# Patient Record
Sex: MALE | Race: WHITE | NOT HISPANIC OR LATINO | Employment: FULL TIME | URBAN - METROPOLITAN AREA
[De-identification: names, ages, dates, MRNs, and addresses within clinical notes are randomized per-mention and may not be internally consistent; named-entity substitution may affect disease eponyms.]

---

## 2021-05-08 ENCOUNTER — IMMUNIZATIONS (OUTPATIENT)
Dept: FAMILY MEDICINE CLINIC | Facility: HOSPITAL | Age: 56
End: 2021-05-08

## 2021-05-08 DIAGNOSIS — Z23 ENCOUNTER FOR IMMUNIZATION: Primary | ICD-10-CM

## 2021-05-08 PROCEDURE — 0001A SARS-COV-2 / COVID-19 MRNA VACCINE (PFIZER-BIONTECH) 30 MCG: CPT | Performed by: NURSE PRACTITIONER

## 2021-05-08 PROCEDURE — 91300 SARS-COV-2 / COVID-19 MRNA VACCINE (PFIZER-BIONTECH) 30 MCG: CPT | Performed by: NURSE PRACTITIONER

## 2021-06-04 ENCOUNTER — IMMUNIZATIONS (OUTPATIENT)
Dept: FAMILY MEDICINE CLINIC | Facility: HOSPITAL | Age: 56
End: 2021-06-04

## 2021-06-04 DIAGNOSIS — Z23 ENCOUNTER FOR IMMUNIZATION: Primary | ICD-10-CM

## 2021-06-04 PROCEDURE — 0002A SARS-COV-2 / COVID-19 MRNA VACCINE (PFIZER-BIONTECH) 30 MCG: CPT

## 2021-06-04 PROCEDURE — 91300 SARS-COV-2 / COVID-19 MRNA VACCINE (PFIZER-BIONTECH) 30 MCG: CPT

## 2021-11-05 ENCOUNTER — APPOINTMENT (OUTPATIENT)
Dept: RADIOLOGY | Facility: CLINIC | Age: 56
End: 2021-11-05
Payer: COMMERCIAL

## 2021-11-05 VITALS
BODY MASS INDEX: 25.2 KG/M2 | HEIGHT: 70 IN | WEIGHT: 176 LBS | SYSTOLIC BLOOD PRESSURE: 130 MMHG | DIASTOLIC BLOOD PRESSURE: 84 MMHG

## 2021-11-05 DIAGNOSIS — M25.511 RIGHT SHOULDER PAIN, UNSPECIFIED CHRONICITY: ICD-10-CM

## 2021-11-05 DIAGNOSIS — M24.811 INTERNAL DERANGEMENT OF RIGHT SHOULDER: Primary | ICD-10-CM

## 2021-11-05 PROCEDURE — 73030 X-RAY EXAM OF SHOULDER: CPT

## 2021-11-05 PROCEDURE — 99203 OFFICE O/P NEW LOW 30 MIN: CPT | Performed by: ORTHOPAEDIC SURGERY

## 2021-11-05 RX ORDER — ALBUTEROL SULFATE 90 UG/1
2 AEROSOL, METERED RESPIRATORY (INHALATION) EVERY 6 HOURS PRN
COMMUNITY

## 2021-11-05 RX ORDER — OLMESARTAN MEDOXOMIL 20 MG/1
20 TABLET ORAL DAILY
COMMUNITY

## 2021-11-10 ENCOUNTER — EVALUATION (OUTPATIENT)
Dept: PHYSICAL THERAPY | Facility: CLINIC | Age: 56
End: 2021-11-10
Payer: COMMERCIAL

## 2021-11-10 DIAGNOSIS — G89.29 CHRONIC RIGHT SHOULDER PAIN: Primary | ICD-10-CM

## 2021-11-10 DIAGNOSIS — M25.511 CHRONIC RIGHT SHOULDER PAIN: Primary | ICD-10-CM

## 2021-11-10 DIAGNOSIS — M24.811 INTERNAL DERANGEMENT OF RIGHT SHOULDER: ICD-10-CM

## 2021-11-10 PROCEDURE — 97110 THERAPEUTIC EXERCISES: CPT | Performed by: PHYSICAL THERAPIST

## 2021-11-10 PROCEDURE — 97161 PT EVAL LOW COMPLEX 20 MIN: CPT | Performed by: PHYSICAL THERAPIST

## 2021-11-19 ENCOUNTER — OFFICE VISIT (OUTPATIENT)
Dept: PHYSICAL THERAPY | Facility: CLINIC | Age: 56
End: 2021-11-19
Payer: COMMERCIAL

## 2021-11-19 DIAGNOSIS — M25.511 CHRONIC RIGHT SHOULDER PAIN: Primary | ICD-10-CM

## 2021-11-19 DIAGNOSIS — M24.811 INTERNAL DERANGEMENT OF RIGHT SHOULDER: ICD-10-CM

## 2021-11-19 DIAGNOSIS — G89.29 CHRONIC RIGHT SHOULDER PAIN: Primary | ICD-10-CM

## 2021-11-19 PROCEDURE — 97112 NEUROMUSCULAR REEDUCATION: CPT | Performed by: PHYSICAL THERAPIST

## 2021-11-19 PROCEDURE — 97110 THERAPEUTIC EXERCISES: CPT | Performed by: PHYSICAL THERAPIST

## 2021-11-26 ENCOUNTER — OFFICE VISIT (OUTPATIENT)
Dept: PHYSICAL THERAPY | Facility: CLINIC | Age: 56
End: 2021-11-26
Payer: COMMERCIAL

## 2021-11-26 DIAGNOSIS — M24.811 INTERNAL DERANGEMENT OF RIGHT SHOULDER: ICD-10-CM

## 2021-11-26 DIAGNOSIS — M25.511 CHRONIC RIGHT SHOULDER PAIN: Primary | ICD-10-CM

## 2021-11-26 DIAGNOSIS — G89.29 CHRONIC RIGHT SHOULDER PAIN: Primary | ICD-10-CM

## 2021-11-26 PROCEDURE — 97110 THERAPEUTIC EXERCISES: CPT | Performed by: PHYSICAL THERAPIST

## 2021-11-26 PROCEDURE — 97112 NEUROMUSCULAR REEDUCATION: CPT | Performed by: PHYSICAL THERAPIST

## 2021-12-03 ENCOUNTER — OFFICE VISIT (OUTPATIENT)
Dept: PHYSICAL THERAPY | Facility: CLINIC | Age: 56
End: 2021-12-03
Payer: COMMERCIAL

## 2021-12-03 DIAGNOSIS — G89.29 CHRONIC RIGHT SHOULDER PAIN: Primary | ICD-10-CM

## 2021-12-03 DIAGNOSIS — M25.511 CHRONIC RIGHT SHOULDER PAIN: Primary | ICD-10-CM

## 2021-12-03 DIAGNOSIS — M24.811 INTERNAL DERANGEMENT OF RIGHT SHOULDER: ICD-10-CM

## 2021-12-03 PROCEDURE — 97112 NEUROMUSCULAR REEDUCATION: CPT | Performed by: PHYSICAL THERAPIST

## 2021-12-03 PROCEDURE — 97110 THERAPEUTIC EXERCISES: CPT | Performed by: PHYSICAL THERAPIST

## 2021-12-10 ENCOUNTER — OFFICE VISIT (OUTPATIENT)
Dept: PHYSICAL THERAPY | Facility: CLINIC | Age: 56
End: 2021-12-10
Payer: COMMERCIAL

## 2021-12-10 DIAGNOSIS — G89.29 CHRONIC RIGHT SHOULDER PAIN: Primary | ICD-10-CM

## 2021-12-10 DIAGNOSIS — M24.811 INTERNAL DERANGEMENT OF RIGHT SHOULDER: ICD-10-CM

## 2021-12-10 DIAGNOSIS — M25.511 CHRONIC RIGHT SHOULDER PAIN: Primary | ICD-10-CM

## 2021-12-10 PROCEDURE — 97140 MANUAL THERAPY 1/> REGIONS: CPT | Performed by: PHYSICAL THERAPIST

## 2021-12-10 PROCEDURE — 97112 NEUROMUSCULAR REEDUCATION: CPT | Performed by: PHYSICAL THERAPIST

## 2021-12-17 ENCOUNTER — OFFICE VISIT (OUTPATIENT)
Dept: PHYSICAL THERAPY | Facility: CLINIC | Age: 56
End: 2021-12-17
Payer: COMMERCIAL

## 2021-12-17 DIAGNOSIS — M25.511 CHRONIC RIGHT SHOULDER PAIN: Primary | ICD-10-CM

## 2021-12-17 DIAGNOSIS — G89.29 CHRONIC RIGHT SHOULDER PAIN: Primary | ICD-10-CM

## 2021-12-17 DIAGNOSIS — M24.811 INTERNAL DERANGEMENT OF RIGHT SHOULDER: ICD-10-CM

## 2021-12-17 PROCEDURE — 97112 NEUROMUSCULAR REEDUCATION: CPT | Performed by: PHYSICAL THERAPIST

## 2021-12-17 PROCEDURE — 97110 THERAPEUTIC EXERCISES: CPT | Performed by: PHYSICAL THERAPIST

## 2021-12-17 PROCEDURE — 97140 MANUAL THERAPY 1/> REGIONS: CPT | Performed by: PHYSICAL THERAPIST

## 2021-12-23 ENCOUNTER — EVALUATION (OUTPATIENT)
Dept: PHYSICAL THERAPY | Facility: CLINIC | Age: 56
End: 2021-12-23
Payer: COMMERCIAL

## 2021-12-23 DIAGNOSIS — G89.29 CHRONIC RIGHT SHOULDER PAIN: Primary | ICD-10-CM

## 2021-12-23 DIAGNOSIS — M25.511 CHRONIC RIGHT SHOULDER PAIN: Primary | ICD-10-CM

## 2021-12-23 DIAGNOSIS — M24.811 INTERNAL DERANGEMENT OF RIGHT SHOULDER: ICD-10-CM

## 2021-12-23 PROCEDURE — 97112 NEUROMUSCULAR REEDUCATION: CPT | Performed by: PHYSICAL THERAPIST

## 2021-12-23 PROCEDURE — 97110 THERAPEUTIC EXERCISES: CPT | Performed by: PHYSICAL THERAPIST

## 2021-12-23 PROCEDURE — 97140 MANUAL THERAPY 1/> REGIONS: CPT | Performed by: PHYSICAL THERAPIST

## 2022-01-07 ENCOUNTER — OFFICE VISIT (OUTPATIENT)
Dept: PHYSICAL THERAPY | Facility: CLINIC | Age: 57
End: 2022-01-07
Payer: COMMERCIAL

## 2022-01-07 DIAGNOSIS — M24.811 INTERNAL DERANGEMENT OF RIGHT SHOULDER: ICD-10-CM

## 2022-01-07 DIAGNOSIS — G89.29 CHRONIC RIGHT SHOULDER PAIN: Primary | ICD-10-CM

## 2022-01-07 DIAGNOSIS — M25.511 CHRONIC RIGHT SHOULDER PAIN: Primary | ICD-10-CM

## 2022-01-07 PROCEDURE — 97110 THERAPEUTIC EXERCISES: CPT | Performed by: PHYSICAL THERAPIST

## 2022-01-07 PROCEDURE — 97140 MANUAL THERAPY 1/> REGIONS: CPT | Performed by: PHYSICAL THERAPIST

## 2022-01-07 NOTE — PROGRESS NOTES
Daily Note     Today's date: 2022  Patient name: Cheli Felix  : 1965  MRN: [id number]  Referring provider: Sumit Mtz DO  Dx:   Encounter Diagnosis     ICD-10-CM    1  Chronic right shoulder pain  M25 511     G89 29    2  Internal derangement of right shoulder  M24 811        Start Time: 0703          Subjective: Feeling okay  Still has pinching to clavicular region and superior GHJ  Objective: See treatment diary below      Assessment: Restricted CTJ bilaterally  Cavitation noted with CTJ HVLAT bilaterally; followed with improved anterior cervical musculature extensibility upon palpation  No difference following cervical glide HVLAT  He will benefit from more mobilization to the CTJ in the future  Tolerated treatment well  Patient demonstrated fatigue post treatment and would benefit from continued PT      Plan: Continue per plan of care  Precautions: R AC joint sprain  Personal factors: h/o R epicondylitis  Pt goals: return to cycling, yard work  EPOC: 21  F/u with [email or web address]      HEP:  Access Code: Jessie Eastman  URL: [email or web address]  Date: 2022  Prepared by: Ash Vigil    Exercises  · First Rib Mobilization with Strap - 15 reps  · Cat-Camel - 15 reps  · Shoulder Flexion Wall Slide with Towel - 15 reps (wall slide, cervical rotation toward, cervical ext)           11/10 11/19 11/26 12/3 12/10 12/17 12/23 1/7     Manuals             RE       [address]      CTJ HVLAT        GREG     Cervical HVLAT        GREG     Anterior cervical, pec, UT STM     GREG GREG GREG GREG     1st rib mob     [address]     Clavicle mob       [address] GREG     Central, unilat, [place] C7-T3      GREG                    Neuro Re-Ed             scap rtrxn 5"x15      EOT 10# 2x15 EOT 10# 2x15     Prone squeeze  5"x15 5"x15          Prone ATY  2x10 2x10 3x10 3x10 3x10  AT only 1# 2x15     Chin tuck x15 x15 prone 5"x15 prone 5"x15 prone 5"x15 Prone 10"x10       DNF  10"x10 5"x15 10"x10 10"x10 10"x10 KB carrys    @90/90 flex 10# 30"x3 ea                      Ther Ex             UBE  3'/3' 3'/3' 3'/3'  3'/3' 3'/3' 3'/3'     Elliptical     3'/3'        Cat cow        x10     Doorway wall slide, cervical rot, cervical ext        x10 ea     UT stretch 20"x2 ea            Doorway pec  10"x10 10"x10          Scalene stretch with towel     20"x2 20"x3       1st rib mob with towel     x20        SCM stretch     20"x2        tspine ext over chair  +1/2 foam x15  x15         tspine rot open book  x15  x15 Quad x15 ea Quad x15 ea       Supine OH ecc lower     15# 2x15 15# 2x15       Er pull apart btb x15 rtb 2x15 rtb 2x20          HABD pull apart  rtb 2x15 rtb 2x20          Sh' ext  rtb 2x15 rtb 2x20 55# 3x15         row  rtb 2x15 rtb 2x20 55# 3x15         shrugs  rtb 2x15 rtb 2x20 rtb 3x15         scaption ecc   rtb 2x15 8# 3x10         serratus       supine punch 10# 2x15 supine punch 10# 2x15     Sh' flex +scap rtrxn       5# 2x15 7# 2x15     scap pull up       2x10                                Ther Activity                                       Gait Training                                       Modalities

## 2022-01-14 ENCOUNTER — OFFICE VISIT (OUTPATIENT)
Dept: PHYSICAL THERAPY | Facility: CLINIC | Age: 57
End: 2022-01-14
Payer: COMMERCIAL

## 2022-01-14 VITALS
BODY MASS INDEX: 25.62 KG/M2 | SYSTOLIC BLOOD PRESSURE: 130 MMHG | HEIGHT: 70 IN | DIASTOLIC BLOOD PRESSURE: 80 MMHG | WEIGHT: 179 LBS

## 2022-01-14 DIAGNOSIS — M24.811 INTERNAL DERANGEMENT OF RIGHT SHOULDER: ICD-10-CM

## 2022-01-14 DIAGNOSIS — G89.29 CHRONIC RIGHT SHOULDER PAIN: Primary | ICD-10-CM

## 2022-01-14 DIAGNOSIS — S16.1XXA STRAIN OF NECK MUSCLE, INITIAL ENCOUNTER: Primary | ICD-10-CM

## 2022-01-14 DIAGNOSIS — M25.511 CHRONIC RIGHT SHOULDER PAIN: Primary | ICD-10-CM

## 2022-01-14 PROCEDURE — 97110 THERAPEUTIC EXERCISES: CPT | Performed by: PHYSICAL THERAPIST

## 2022-01-14 PROCEDURE — 99214 OFFICE O/P EST MOD 30 MIN: CPT | Performed by: ORTHOPAEDIC SURGERY

## 2022-01-14 PROCEDURE — 97140 MANUAL THERAPY 1/> REGIONS: CPT | Performed by: PHYSICAL THERAPIST

## 2022-01-14 PROCEDURE — 97112 NEUROMUSCULAR REEDUCATION: CPT | Performed by: PHYSICAL THERAPIST

## 2022-01-14 RX ORDER — NAPROXEN 500 MG/1
500 TABLET ORAL 2 TIMES DAILY WITH MEALS
Qty: 28 TABLET | Refills: 0 | Status: SHIPPED | OUTPATIENT
Start: 2022-01-14

## 2022-01-14 NOTE — PROGRESS NOTES
Ortho Sports Medicine Shoulder Follow Up Visit     Assesment:   64 y o  male right shoulder with mild glenohumeral osteoarthritis, and cervical strain    Plan:    Conservative treatment:    Naproxen 500 mg for 2 weeks   May follow up with Pain Management if no improvement in 4-6 weeks     Imaging: All imaging from today was reviewed by myself and explained to the patient  Injection:    No Injection planned at this time  Surgery:     No surgery is recommended at this point, continue with conservative treatment plan as noted  Follow up:    No follow-ups on file  Chief Complaint   Patient presents with    Right Shoulder - Follow-up     History of Present Illness: The patient is returns for follow up of right shoulder possible rotator cuff tear, with mild glenohumeral osteoarthritis  Since the prior visit, He reports some improvement  Patient states that he feels his shoulder pain has subsided however he is having some clavicle sternal pain especially when moving his neck  Patient had previously had Flexeril for some neck spasms, which relieved his pain greatly  Pain is improved by rest, ice, NSAIDS and physical therapy  Pain is aggravated by overhead activity, reaching back and rotation  Symptoms include clicking, catching and popping  The patient denies weakness  The patient has tried rest, ice, NSAIDS and physical therapy          Shoulder Surgical History:  None    Past Medical, Social and Family History:  Past Medical History:   Diagnosis Date    Allergies      Past Surgical History:   Procedure Laterality Date    TONSILLECTOMY       No Known Allergies  Current Outpatient Medications on File Prior to Visit   Medication Sig Dispense Refill    albuterol (Proventil HFA) 90 mcg/act inhaler Inhale 2 puffs every 6 (six) hours as needed for wheezing      olmesartan (BENICAR) 20 mg tablet Take 20 mg by mouth daily       No current facility-administered medications on file prior to visit  Social History     Socioeconomic History    Marital status: Single     Spouse name: Not on file    Number of children: Not on file    Years of education: Not on file    Highest education level: Not on file   Occupational History    Not on file   Tobacco Use    Smoking status: Former Smoker    Smokeless tobacco: Never Used   Substance and Sexual Activity    Alcohol use: Never    Drug use: Not on file    Sexual activity: Not on file   Other Topics Concern    Not on file   Social History Narrative    Not on file     Social Determinants of Health     Financial Resource Strain: Not on file   Food Insecurity: Not on file   Transportation Needs: Not on file   Physical Activity: Not on file   Stress: Not on file   Social Connections: Not on file   Intimate Partner Violence: Not on file   Housing Stability: Not on file       I have reviewed the past medical, surgical, social and family history, medications and allergies as documented in the EMR  Review of systems: ROS is negative other than that noted in the HPI  Constitutional: Negative for fatigue and fever  Physical Exam:    Blood pressure 130/80, height 5' 10" (1 778 m), weight 81 2 kg (179 lb)      General/Constitutional: NAD, well developed, well nourished  HENT: Normocephalic, atraumatic  CV: Intact distal pulses, regular rate  Resp: No respiratory distress or labored breathing  Lymphatic: No lymphadenopathy palpated  Neuro: Alert and Oriented x 3, no focal deficits  Psych: Normal mood, normal affect, normal judgement, normal behavior  Skin: Warm, dry, no rashes, no erythema      Shoulder focused exam:    RIGHT LEFT    Scapula Atrophy Negative Negative     Winging Negative Negative     Protraction Negative Negative    Rotator cuff SS 5/5 5/5     IS 5/5 5/5     SubS 5/5 5/5    ROM     170     ER0 60 60     ER90 90    90     IR90 T6    T6     IRb T6    T6    TTP: AC Negative Negative     Biceps Negative Negative     Coracoid Negative Negative    Special Tests: O'Briens Negative Negative     Hughes-shear Negative Negative     Cross body Adduction Negative Negative     Speeds  Negative Negative     Ellis's Negative Negative     Whipple Negative Negative       Neer Negative Negative     Monet Negative Negative    Instability: Apprehension & relocation not tested not tested     Load & shift not tested not tested    Other: Crank Negative Negative               UE NV Exam: +2 Radial pulses bilaterally  Sensation intact to light touch C5-T1 bilaterally, Radial/median/ulnar nerve motor intact    Cervical ROM is full without pain, numbness or tingling      Shoulder Imaging    No imaging was performed today      Scribe Attestation    I,:  Nancy Luna am acting as a scribe while in the presence of the attending physician :       I,:  Sylvain Newton, DO personally performed the services described in this documentation    as scribed in my presence :

## 2022-01-14 NOTE — PROGRESS NOTES
Daily Note     Today's date: 2022  Patient name: Taya Segovia  : 1965  MRN: [id number]  Referring provider: Cristina Avalos DO  Dx:   Encounter Diagnosis     ICD-10-CM    1  Chronic right shoulder pain  M25 511     G89 29    2  Internal derangement of right shoulder  M24 811        Start Time: 0750          Subjective: No change following CTJ manipulation lv  Continues to have pain at subclavicular, SC joint region with activities such as ABD+IR to remove a shirt  F/u appt with ortho is today  Objective: See treatment diary below      Assessment: CTJ mobility improved upon palpation  No cavitation with CTJ HVLAT  Trial of end range shoulder ext mobilization with some relief of posterior shoulder stiffness  Trial of SC joint IASTM and mobs at increasing deg of shoulder ABD+IR  Some discomfort noted during mobs and no major change noted following with motion of removing the shirt  Ended with single arm row and shoulder extension strengthening  Tolerated treatment well  Patient demonstrated fatigue post treatment and would benefit from continued PT      Plan: Continue per plan of care  Assess f/u with ortho  Precautions: R AC joint sprain  Personal factors: h/o R epicondylitis  Pt goals: return to cycling, yard work  EPOC: 21  F/u with [email or web address]      HEP:  Access Code: Jazmin Duran  URL: [email or web address]  Date: 2022  Prepared by: Riverside Hodgkin    Exercises  · First Rib Mobilization with Strap - 15 reps  · Cat-Camel - 15 reps  · Shoulder Flexion Wall Slide with Towel - 15 reps (wall slide, cervical rotation toward, cervical ext)           11/10 11/19 11/26 12/3 12/10 12/17 12/23 1/7 1/14    Manuals             RE       [address]      CTJ HVLAT        [address] GREG    Cervical HVLAT        GREG     Anterior cervical, pec, UT STM     GREG GREG GREG GREG GREG    1st rib mob     [address]     Clavicle mob       [address] GREG in varying deg of abd elev    SC joint lig IASTM         GREG [place], [place], [place] C7-T3      GREG       S/L sh' ext end range mob/PROM         GREG                                           Neuro Re-Ed             scap rtrxn 5"x15      EOT 10# 2x15 EOT 10# 2x15     Prone squeeze  5"x15 5"x15          Prone ATY  2x10 2x10 3x10 3x10 3x10  AT only 1# 2x15     Chin tuck x15 x15 prone 5"x15 prone 5"x15 prone 5"x15 Prone 10"x10       DNF  10"x10 5"x15 10"x10 10"x10 10"x10       KB carrys    @90/90 flex 10# 30"x3 ea                      Ther Ex             UBE  3'/3' 3'/3' 3'/3'  3'/3' 3'/3' 3'/3' 3'/3'    Elliptical     3'/3'        Cat cow        x10     Doorway wall slide, cervical rot, cervical ext        x10 ea     UT stretch 20"x2 ea            Doorway pec  10"x10 10"x10          Scalene stretch with towel     20"x2 20"x3       1st rib mob with towel     x20        SCM stretch     20"x2        tspine ext over chair  +1/2 foam x15  x15         tspine rot open book  x15  x15 Quad x15 ea Quad x15 ea       Supine OH ecc lower     15# 2x15 15# 2x15       Er pull apart btb x15 rtb 2x15 rtb 2x20          HABD pull apart  rtb 2x15 rtb 2x20          Sh' ext  rtb 2x15 rtb 2x20 55# 3x15     CC 45# 3x10    row  rtb 2x15 rtb 2x20 55# 3x15     CC single arm 25# 3x10    shrugs  rtb 2x15 rtb 2x20 rtb 3x15         scaption ecc   rtb 2x15 8# 3x10         serratus       supine punch 10# 2x15 supine punch 10# 2x15     Sh' flex +scap rtrxn       5# 2x15 7# 2x15     scap pull up       2x10                                Ther Activity                                       Gait Training                                       Modalities

## 2022-01-17 ENCOUNTER — TELEPHONE (OUTPATIENT)
Dept: PAIN MEDICINE | Facility: CLINIC | Age: 57
End: 2022-01-17

## 2022-01-21 ENCOUNTER — OFFICE VISIT (OUTPATIENT)
Dept: PHYSICAL THERAPY | Facility: CLINIC | Age: 57
End: 2022-01-21
Payer: COMMERCIAL

## 2022-01-21 DIAGNOSIS — M24.811 INTERNAL DERANGEMENT OF RIGHT SHOULDER: ICD-10-CM

## 2022-01-21 DIAGNOSIS — M25.511 CHRONIC RIGHT SHOULDER PAIN: Primary | ICD-10-CM

## 2022-01-21 DIAGNOSIS — G89.29 CHRONIC RIGHT SHOULDER PAIN: Primary | ICD-10-CM

## 2022-01-21 PROCEDURE — 97140 MANUAL THERAPY 1/> REGIONS: CPT | Performed by: PHYSICAL THERAPIST

## 2022-01-21 PROCEDURE — 97110 THERAPEUTIC EXERCISES: CPT | Performed by: PHYSICAL THERAPIST

## 2022-01-21 NOTE — PROGRESS NOTES
PT Re-Evaluation  and PT Discharge    Today's date: 2022  Patient name: Eleno Jalloh  : 1965  MRN: [id number]  Referring provider: Rashid Maxwell DO  Dx:   Encounter Diagnosis     ICD-10-CM    1  Chronic right shoulder pain  M25 511     G89 29    2  Internal derangement of right shoulder  M24 811        Start Time: 0707          Assessment  Assessment details: Eleno Jalloh is a 54 y o  male presenting to outpatient physical therapy at [place] [address] with complaints of chronic R shoulder pain   They present with decreased range of motion, decreased strength, limited flexibility, poor postural awareness, poor body mechanics, poor balance, decreased tolerance to activity and decreased functional mobility due to Chronic right shoulder pain  (primary encounter diagnosis)  Delmi Valencia would benefit from skilled physical therapy to address noted impairments in order to allow for full functional return to work and ADL-related activities  Thank you for the referral!    RE: 21  Eleno Jalloh has attended physical therapy for 7 visits  In this time, they have made significant improvements in symptoms and function, as noted by subjective report, improved balance, ROM, strength, flexibility and activity tolerance  They have made positive goal progress with FOTO score NO CHANGE from 50 at initial evaluation to 48 currently  They do continue to have impairments in pain, ROM, strength, balance, flexibility and activity tolerance  Recommend continued skilled PT in order to maximize function  RE: 22  Eleno Jalloh has attended physical therapy for 10 visits  In this time, they have made significant improvements in symptoms and function, as noted by subjective report, improved balance, ROM, strength, flexibility and activity tolerance  They have made positive goal progress with FOTO score improvement from 50 at initial evaluation to 58 currently  Recommend d/c to HEP at this time          Impairments: abnormal muscle firing, abnormal or restricted ROM, activity intolerance, impaired balance, impaired physical strength, lacks appropriate home exercise program, pain with function, scapular dyskinesis, poor posture  and poor body mechanics  Barriers to therapy: n/a  Understanding of Dx/Px/POC: excellent  Goals  ST   Independent with HEP in 2 weeks - met  2  Decrease pain by 50% in 3 wks - near met  3   Increase R shoulder elevation AROM to [facility], nonpainful in 3 weeks - not met    LT  Achieve FOTO score of 70/100 in 6 weeks - near met  62/100  2   Able to return to cycling in 6 weeks - not met  3  Strength = 5/5 R lower trap in 6 weeks - not met      Plan  Plan details: D/c to HEP  Patient would benefit from: skilled physical therapy  Planned modality interventions: cryotherapy, electrical stimulation/Russian stimulation and thermotherapy: hydrocollator packs  Planned therapy interventions: abdominal trunk stabilization, manual therapy, neuromuscular re-education, therapeutic activities, therapeutic exercise, body mechanics training and home exercise program  Treatment plan discussed with: patient        Subjective Evaluation    History of Present Illness  Mechanism of injury: Pt c/o R shoulder pain that began 11 mos ago  He was painting and moving an extension ladder around, which he thinks aggravated the shoulder  He noticed pain the following day  Denies hearing/feeling a popping at the time  Xray results pending of R shoulder  Per Dr Jacob Mcwilliams notes, he interprets no actue osseous abnormalities; mild GHJ arthritis  Pain is located mid clavicle, subclavian and upper trap region  Occasional radiation to the R cervical  Pain is described as constant low level ache  Rated 2/10, 7/10 when worse  Aggravated with lifting dishes to the cabinet at shoulder height, lifting the arm, rolling over in bed, weight bearing through the R elbow for extended periods of time  No easing factors   Positive for a "clunk" feeling with HABD movement  Denies RUE N/T, weakness  He works in [employer] set up  He notes his desk chair at home is lower than his chair at work and therefore his arms are up higher resting on the table - unsure if that is affecting his shoulder  In his free time he enjoys golf, though he has not been able to play due to lack of free time and shoulder pain  He is also unable to bicycle or do yard work due to the shoulder  Will consider MRI and CSI at f/u with Dr Alejandro Ruiz pending symptoms following course of therapy  RE: 12/23/21  Pt reports some improvement in his subclavian region, but has noticed pain to the R shoulder more lately  Pain eases with stretches, aggravates with removing shirts, elevation, moving after prolonged inactivity, WB  He's been consistent with the HEP  Would like to continue therapy in order to continue making progress in his symptoms  RE: 1/21/22  Pt reports overall not much change in his symptoms with therapy  He continues to have pain in the [facility] joint/subclavicular region described as pinching, that is aggravated with movements like taking a shirt off, abd+ER  He's been consistent with the HEP  Recent f/u with ortho pt notes he was dissatisfied with the denial response he got for requesting muscle relaxors  He plans to f/u with his PCP in order to trial this prescription  He is also considering future consultation with pain mgmt if no improvement  He is prepared for d/c from PT at this time    Patient Goals  Patient goals for therapy: decreased pain, increased motion, return to work, return to sport/leisure activities, increased strength and independence with ADLs/IADLs          Objective     Postural Observations    Additional Postural Observation Details  Fwd head, rounded shoulders, scapulae protracted slightly at rest    Tenderness     Additional Tenderness Details  TTP middle clavicle, R UT, LS, subclavian    (+) resisted HABD, HADD  (+) painful arc  (-) empty, full can  (-) mccloud  (-) belly press    Cervical/Thoracic Screen   Cervical range of motion within normal limits with the following exceptions: L lateral flexion limited mildly secondary to soft tissue restriction  Thoracic range of motion within normal limits with the following exceptions: Mild restriction thoracic ext    Active Range of Motion   Left Shoulder   Flexion: WFL  Abduction: WFL  External rotation BTH: WFL  Internal rotation BTB: WFL    Right Shoulder   Flexion: WFL and with pain  Abduction: WFL and with pain  External rotation BTH: WFL  Internal rotation BTB: WFL    Strength/Myotome Testing     Left Shoulder     Planes of Motion   Flexion: 5   Abduction: 5   Adduction: 5   External rotation at 0°: 5   Internal rotation at 0°: 5     Isolated Muscles   Biceps: 5   Latissimus: 5   Lower trapezius: 4+   Middle trapezius: 5   Rhomboids: 5   Supraspinatus: 5   Triceps: 5     Right Shoulder     Planes of Motion   Flexion: 5   Abduction: 5   Adduction: 5   External rotation at 0°: 5   Internal rotation at 0°: 5   Horizontal abduction: 5   Horizontal adduction: 5     Isolated Muscles   Biceps: 5   Latissimus: 5   Lower trapezius: 4   Middle trapezius: 5   Rhomboids: 4+   Supraspinatus: 5   Triceps: 5              Precautions: R AC joint sprain  Personal factors: h/o R epicondylitis  Pt goals: return to cycling, yard work  EPOC: 21  F/u with [email or web address]      HEP:  Access Code: [email or web address]  URL: [email or web address]  Date: 2022  Prepared by: Jeaneth Marts    Exercises  · First Rib Mobilization with Strap - 15 reps  · Cat-Camel - 15 reps  · Shoulder Flexion Wall Slide with Towel - 15 reps (wall slide, cervical rotation toward, cervical ext)           11/10 11/19 11/26 12/3 12/10 12/17 12/23 1/7 1/14 1/21   Manuals             RE       [address]   CTJ HVLAT        GREG GREG    Cervical HVLAT        GREG     Anterior cervical, pec, UT STM     [address] GREG [place]    1st rib mob     [address]     Clavicle mob       [address] GREG in varying deg of abd elev    SC joint lig IASTM         GREG    Central, unilat, [place] C7-T3      GREG       S/L sh' ext end range mob/PROM         GREG                                           Neuro Re-Ed             scap rtrxn 5"x15      EOT 10# 2x15 EOT 10# 2x15     Prone squeeze  5"x15 5"x15          Prone ATY  2x10 2x10 3x10 3x10 3x10  AT only 1# 2x15     Chin tuck x15 x15 prone 5"x15 prone 5"x15 prone 5"x15 Prone 10"x10       DNF  10"x10 5"x15 10"x10 10"x10 10"x10       KB carrys    @90/90 flex 10# 30"x3 ea                      Ther Ex             UBE  3'/3' 3'/3' 3'/3'  3'/3' 3'/3' 3'/3' 3'/3' 3'/3'   Elliptical     3'/3'        Cat cow        x10     Doorway wall slide, cervical rot, cervical ext        x10 ea     UT stretch 20"x2 ea            Doorway pec  10"x10 10"x10          Scalene stretch with towel     20"x2 20"x3       1st rib mob with towel     x20        SCM stretch     20"x2        tspine ext over chair  +1/2 foam x15  x15         tspine rot open book  x15  x15 Quad x15 ea Quad x15 ea       Supine OH ecc lower     15# 2x15 15# 2x15       Er pull apart btb x15 rtb 2x15 rtb 2x20          HABD pull apart  rtb 2x15 rtb 2x20          Sh' ext  rtb 2x15 rtb 2x20 55# 3x15     CC 45# 3x10    row  rtb 2x15 rtb 2x20 55# 3x15     CC single arm 25# 3x10    shrugs  rtb 2x15 rtb 2x20 rtb 3x15         scaption ecc   rtb 2x15 8# 3x10         serratus       supine punch 10# 2x15 supine punch 10# 2x15     Sh' flex +scap rtrxn       5# 2x15 7# 2x15     scap pull up       2x10                                Ther Activity                                       Gait Training                                       Modalities

## 2022-01-25 ENCOUNTER — TELEPHONE (OUTPATIENT)
Dept: PAIN MEDICINE | Facility: CLINIC | Age: 57
End: 2022-01-25

## 2022-01-28 ENCOUNTER — APPOINTMENT (OUTPATIENT)
Dept: PHYSICAL THERAPY | Facility: CLINIC | Age: 57
End: 2022-01-28
Payer: COMMERCIAL

## 2022-02-02 ENCOUNTER — TELEPHONE (OUTPATIENT)
Dept: PAIN MEDICINE | Facility: CLINIC | Age: 57
End: 2022-02-02

## 2022-02-02 NOTE — TELEPHONE ENCOUNTER
S/w Pt to schedule consult with Dr Qamar Beyer    Pt decided to wait to schedule, Pt wants to talk to his PCP first regarding Ortho ov recommendation     Pt will call us if/when he is ready

## 2022-04-06 ENCOUNTER — TELEPHONE (OUTPATIENT)
Dept: PAIN MEDICINE | Facility: CLINIC | Age: 57
End: 2022-04-06

## 2022-04-06 NOTE — TELEPHONE ENCOUNTER
S/W Pt about scheduling a consult  He said that his PCP prescribed him a muscle relaxer and now his feel better  He don't need our services

## 2022-06-17 ENCOUNTER — OFFICE VISIT (OUTPATIENT)
Dept: OBGYN CLINIC | Facility: CLINIC | Age: 57
End: 2022-06-17
Payer: COMMERCIAL

## 2022-06-17 VITALS
HEIGHT: 70 IN | BODY MASS INDEX: 25.77 KG/M2 | HEART RATE: 71 BPM | DIASTOLIC BLOOD PRESSURE: 78 MMHG | SYSTOLIC BLOOD PRESSURE: 127 MMHG | WEIGHT: 180 LBS

## 2022-06-17 DIAGNOSIS — M19.011 ARTHRITIS OF RIGHT GLENOHUMERAL JOINT: Primary | ICD-10-CM

## 2022-06-17 PROCEDURE — 99214 OFFICE O/P EST MOD 30 MIN: CPT | Performed by: ORTHOPAEDIC SURGERY

## 2022-06-17 RX ORDER — NAPROXEN 500 MG/1
500 TABLET ORAL 2 TIMES DAILY WITH MEALS
Qty: 30 TABLET | Refills: 0 | Status: SHIPPED | OUTPATIENT
Start: 2022-06-17

## 2022-06-17 NOTE — PROGRESS NOTES
Ortho Sports Medicine Shoulder Follow Up Visit     Assesment:   64 y o  male right shoulder mild glenohumeral osteoarthritis and possible small rotator cuff tear    Plan:    Conservative treatment:    Ice to shoulder 1-2 times daily, for 20 minutes at a time  PT for ROM and strengthening to shoulder, rotator cuff, scapular stabilizers  He may attend once or twice to obtain a home exercise plan  Prescription NSAIDs (Naproxen 500mg) 2 week supply  Distal paresthesias may be stemming from nerve compression, golden  Referral to Spine Surgeon for possible cervical radiculopathy will be placed in the future if symptoms worsen or fail to improve  Imaging:    No imaging was available for review today  We will consider an MRI of the right shoulder in the future if symptoms worsen or fail to improve with conservative treatment         Injection:    No Injection planned at this time  The patient denied an injection at today's visit  Surgery:     No surgery is recommended at this point, continue with conservative treatment plan as noted  Follow up:    No follow-ups on file  Chief Complaint   Patient presents with    Right Shoulder - Follow-up         History of Present Illness: The patient is returns for follow up of new right shoulder pain and possible rotator cuff tear  Since the prior visit, He reports no improvement  He was fine taking Motrin as needed, but recently started experiencing a different kind of shoulder pain originating from the scapulothoracic area and radiating into the lateral arm, occasionally causing distal paresthesias  Pain is improved by rest, ice, and Motrin specifically  Pain is aggravated by driving, sitting at his desk  Symptoms include night pain and distal paresthesias to his thumb and 5th fingers when driving and at his work desk  When asked, he states he does hunch and practice bad posture when in these positions  The patient denies weakness  The patient has tried rest, ice, Motrin and physical therapy  He was prescribed naproxen at his last visit but did not fill the prescription, and is interested to see this medication will help him more than the over-the-counter Motrin, or if he should continue to take a stronger Motrin  Shoulder Surgical History:  None    Past Medical, Social and Family History:  Past Medical History:   Diagnosis Date    Allergies      Past Surgical History:   Procedure Laterality Date    TONSILLECTOMY       No Known Allergies  Current Outpatient Medications on File Prior to Visit   Medication Sig Dispense Refill    albuterol (Proventil HFA) 90 mcg/act inhaler Inhale 2 puffs every 6 (six) hours as needed for wheezing      naproxen (Naprosyn) 500 mg tablet Take 1 tablet (500 mg total) by mouth 2 (two) times a day with meals 28 tablet 0    olmesartan (BENICAR) 20 mg tablet Take 20 mg by mouth daily       No current facility-administered medications on file prior to visit       Social History     Socioeconomic History    Marital status: Single     Spouse name: Not on file    Number of children: Not on file    Years of education: Not on file    Highest education level: Not on file   Occupational History    Not on file   Tobacco Use    Smoking status: Former Smoker    Smokeless tobacco: Never Used   Substance and Sexual Activity    Alcohol use: Never    Drug use: Not on file    Sexual activity: Not on file   Other Topics Concern    Not on file   Social History Narrative    Not on file     Social Determinants of Health     Financial Resource Strain: Not on file   Food Insecurity: Not on file   Transportation Needs: Not on file   Physical Activity: Not on file   Stress: Not on file   Social Connections: Not on file   Intimate Partner Violence: Not on file   Housing Stability: Not on file       I have reviewed the past medical, surgical, social and family history, medications and allergies as documented in the EMR     Review of systems: ROS is negative other than that noted in the HPI  Constitutional: Negative for fatigue and fever  Physical Exam:    Blood pressure 127/78, pulse 71, height 5' 10" (1 778 m), weight 81 6 kg (180 lb)      General/Constitutional: NAD, well developed, well nourished  HENT: Normocephalic, atraumatic  CV: Intact distal pulses, regular rate  Resp: No respiratory distress or labored breathing  Lymphatic: No lymphadenopathy palpated  Neuro: Alert and Oriented x 3, no focal deficits  Psych: Normal mood, normal affect, normal judgement, normal behavior  Skin: Warm, dry, no rashes, no erythema      Shoulder focused exam:       RIGHT LEFT    Scapula Atrophy Negative Negative     Winging Negative Negative     Protraction Negative Negative    Rotator cuff SS 5/5 5/5     IS 5/5 5/5     SubS 5/5 5/5    ROM     170     ER0 60 60     ER90 90    90     IR90 T6    T6     IRb T6    T6    TTP: AC Negative Negative     Biceps Negative Negative     Coracoid Negative Negative    Special Tests: O'Briens Negative Negative     Hughes-shear Negative Negative     Cross body Adduction Negative Negative     Speeds  Negative Negative     Ellis's Negative Negative     Whipple Negative Negative       Neer Negative Negative     Monet Negative Negative    Instability: Apprehension & relocation not tested not tested     Load & shift not tested not tested    Other: Crank Negative Negative               UE NV Exam: +2 Radial pulses bilaterally  Sensation intact to light touch C5-T1 bilaterally, Radial/median/ulnar nerve motor intact    Cervical ROM is full without pain, numbness or tingling      Shoulder Imaging    No imaging was performed today       Scribe Attestation    I,:  Lou Ramirez am acting as a scribe while in the presence of the attending physician :       I,:  Tuan Newton DO personally performed the services described in this documentation    as scribed in my presence :

## 2023-10-25 ENCOUNTER — OFFICE VISIT (OUTPATIENT)
Dept: PODIATRY | Facility: CLINIC | Age: 58
End: 2023-10-25
Payer: COMMERCIAL

## 2023-10-25 ENCOUNTER — APPOINTMENT (OUTPATIENT)
Dept: RADIOLOGY | Facility: CLINIC | Age: 58
End: 2023-10-25
Payer: COMMERCIAL

## 2023-10-25 VITALS
HEART RATE: 77 BPM | SYSTOLIC BLOOD PRESSURE: 154 MMHG | DIASTOLIC BLOOD PRESSURE: 93 MMHG | BODY MASS INDEX: 25.83 KG/M2 | HEIGHT: 70 IN

## 2023-10-25 DIAGNOSIS — M10.071 ACUTE IDIOPATHIC GOUT OF RIGHT FOOT: Primary | ICD-10-CM

## 2023-10-25 DIAGNOSIS — M10.071 ACUTE IDIOPATHIC GOUT OF RIGHT FOOT: ICD-10-CM

## 2023-10-25 PROBLEM — M1A.0790 IDIOPATHIC CHRONIC GOUT OF FOOT WITHOUT TOPHUS: Status: ACTIVE | Noted: 2023-10-25

## 2023-10-25 PROCEDURE — 73630 X-RAY EXAM OF FOOT: CPT

## 2023-10-25 PROCEDURE — 99203 OFFICE O/P NEW LOW 30 MIN: CPT | Performed by: PODIATRIST

## 2023-10-25 NOTE — PROGRESS NOTES
PATIENT:  Eusebio Hickman  1965       ASSESSMENT:     1. Acute idiopathic gout of right foot  XR foot 3+ vw right                PLAN:  1. Reviewed medical records. Patient was counseled and educated on the condition and the diagnosis. 2. X-ray was obtained and personally reviewed. The radiological findings were discussed with the patient. 3. The exam and symptoms are consistent with gout. The diagnosis, treatment options and prognosis were discussed with the patient. 4. Pain resolved at this time. Educated low purine diet and supportive care. 5. Discussed possible long term medical treatment vs treatment as needed with colchicine and possible injection. 6. Recommended obtaining Uric acid level with next blood work. 7. Pt to call the office for any increased symptoms. Imaging: I have personally reviewed pertinent films in PACS  Labs, pathology, and Other Studies: I have personally reviewed pertinent reports. Subjective:       HPI  The patient presents with chief complaint of right foot pain. He has history of gout for many years. He had a flare-up 2 weeks ago with pain around right great toe joint. He has flare up once every few years. Pain resolved at this time with colchicine. Denied any injury. No associated numbness or paresthesia. No significant weakness or dysfunction. The following portions of the patient's history were reviewed and updated as appropriate: allergies, current medications, past family history, past medical history, past social history, past surgical history and problem list.  All pertinent labs and images were reviewed. Past Medical History  Past Medical History:   Diagnosis Date    Allergies        Past Surgical History  Past Surgical History:   Procedure Laterality Date    TONSILLECTOMY          Allergies:  Patient has no known allergies.     Medications:  Current Outpatient Medications   Medication Sig Dispense Refill albuterol (Proventil HFA) 90 mcg/act inhaler Inhale 2 puffs every 6 (six) hours as needed for wheezing      naproxen (Naprosyn) 500 mg tablet Take 1 tablet (500 mg total) by mouth 2 (two) times a day with meals 28 tablet 0    naproxen (Naprosyn) 500 mg tablet Take 1 tablet (500 mg total) by mouth 2 (two) times a day with meals 30 tablet 0    olmesartan (BENICAR) 20 mg tablet Take 20 mg by mouth daily       No current facility-administered medications for this visit. Social History:  Social History     Socioeconomic History    Marital status: Single     Spouse name: Not on file    Number of children: Not on file    Years of education: Not on file    Highest education level: Not on file   Occupational History    Not on file   Tobacco Use    Smoking status: Former    Smokeless tobacco: Never   Substance and Sexual Activity    Alcohol use: Never    Drug use: Not on file    Sexual activity: Not on file   Other Topics Concern    Not on file   Social History Narrative    Not on file     Social Determinants of Health     Financial Resource Strain: Not on file   Food Insecurity: Not on file   Transportation Needs: Not on file   Physical Activity: Not on file   Stress: Not on file   Social Connections: Not on file   Intimate Partner Violence: Not on file   Housing Stability: Not on file          Review of Systems   Constitutional:  Negative for chills and fever. Respiratory:  Negative for cough and shortness of breath. Cardiovascular:  Negative for chest pain and leg swelling. Gastrointestinal:  Negative for nausea and vomiting. Musculoskeletal:  Negative for gait problem. Skin:  Negative for wound. Allergic/Immunologic: Negative for immunocompromised state. Neurological:  Negative for weakness and numbness. Hematological: Negative. Psychiatric/Behavioral:  Negative for behavioral problems and confusion.           Objective:      /93   Pulse 77   Ht 5' 10" (1.778 m)   BMI 25.83 kg/m² Physical Exam  Vitals reviewed. Constitutional:       General: He is not in acute distress. Appearance: He is not toxic-appearing or diaphoretic. HENT:      Head: Normocephalic and atraumatic. Eyes:      Extraocular Movements: Extraocular movements intact. Cardiovascular:      Rate and Rhythm: Normal rate and regular rhythm. Pulses: Normal pulses. Dorsalis pedis pulses are 2+ on the right side and 2+ on the left side. Posterior tibial pulses are 2+ on the right side and 2+ on the left side. Pulmonary:      Effort: Pulmonary effort is normal. No respiratory distress. Musculoskeletal:      Cervical back: Normal range of motion and neck supple. Right lower leg: No edema. Left lower leg: No edema. Right foot: No foot drop. Left foot: No foot drop. Comments: Hypertrophy of right 1st MPJ with mild fullness. No pain on palpation or ROM. Skin:     General: Skin is warm. Capillary Refill: Capillary refill takes less than 2 seconds. Coloration: Skin is not cyanotic or mottled. Findings: No abscess or wound. Nails: There is no clubbing. Neurological:      General: No focal deficit present. Mental Status: He is alert and oriented to person, place, and time. Cranial Nerves: No cranial nerve deficit. Sensory: No sensory deficit. Motor: No weakness. Coordination: Coordination normal.   Psychiatric:         Mood and Affect: Mood normal.         Behavior: Behavior normal.         Thought Content:  Thought content normal.         Judgment: Judgment normal.

## 2024-03-13 ENCOUNTER — PREP FOR PROCEDURE (OUTPATIENT)
Age: 59
End: 2024-03-13

## 2024-03-13 ENCOUNTER — TELEPHONE (OUTPATIENT)
Age: 59
End: 2024-03-13

## 2024-03-13 DIAGNOSIS — Z12.11 SCREENING FOR COLON CANCER: Primary | ICD-10-CM

## 2024-03-13 NOTE — TELEPHONE ENCOUNTER
Scheduled date of colonoscopy (as of today):5/10/24  Physician performing colonoscopy:Dr. Gaona  Location of colonoscopy:[place]  Bowel prep reviewed with patient:pt is requesting the Clenpiq for his prep. Please send the script and prep instructions to his email ([email or web address]) once confirmed prep is ok to use.   Instructions reviewed with patient by:  Clearances: N/A

## 2024-03-13 NOTE — TELEPHONE ENCOUNTER
03/13/24  Screened by: Pauline Nur    Referring Provider     Pre- Screening:     There is no height or weight on file to calculate BMI.  Has patient been referred for a routine screening Colonoscopy? yes  Is the patient between 45-75 years old? yes      Previous Colonoscopy yes   If yes:    Date: 3 yrs ago    Facility:     Reason:         Does the patient want to see a Gastroenterologist prior to their procedure OR are they having any GI symptoms? no    Has the patient been hospitalized or had abdominal surgery in the past 6 months? no    Does the patient use supplemental oxygen? no    Does the patient take Coumadin, Lovenox, Plavix, Elliquis, Xarelto, or other blood thinning medication? no    Has the patient had a stroke, cardiac event, or stent placed in the past year? no      If patient is between 45yrs - 49yrs, please advise patient that we will have to confirm benefits & coverage with their insurance company for a routine screening colonoscopy.

## 2024-04-25 ENCOUNTER — TELEPHONE (OUTPATIENT)
Dept: GASTROENTEROLOGY | Facility: CLINIC | Age: 59
End: 2024-04-25

## 2024-04-25 NOTE — TELEPHONE ENCOUNTER
Patient was a direct schedule for a colonoscopy and is requesting Clenpiq prep. Most recent lab work from 10/31/23 scanned into patient chart.

## 2024-04-26 DIAGNOSIS — Z12.11 SCREENING FOR COLON CANCER: Primary | ICD-10-CM

## 2024-04-26 RX ORDER — SODIUM PICOSULFATE, MAGNESIUM OXIDE, AND ANHYDROUS CITRIC ACID 12; 3.5; 1 G/175ML; G/175ML; MG/175ML
LIQUID ORAL
Qty: 350 ML | Refills: 0 | Status: SHIPPED | OUTPATIENT
Start: 2024-04-26

## 2024-04-26 NOTE — TELEPHONE ENCOUNTER
I spoke with Eliezer and alerted him that Dr Gaona approved the Clenpiq, and sent it to [facility] in [place]. I emailed instructions to [email or web address].

## 2024-04-26 NOTE — TELEPHONE ENCOUNTER
Patients GI provider:  Dr. Gaona    Number to return call: [phone]    Reason for call: Pt calling requesting clenpiq for his colonoscopy.     Scheduled procedure/appointment date if applicable: procedure 5/10/2024

## 2024-04-29 ENCOUNTER — OFFICE VISIT (OUTPATIENT)
Dept: URGENT CARE | Facility: CLINIC | Age: 59
End: 2024-04-29
Payer: COMMERCIAL

## 2024-04-29 VITALS
BODY MASS INDEX: 24.91 KG/M2 | OXYGEN SATURATION: 97 % | DIASTOLIC BLOOD PRESSURE: 68 MMHG | HEIGHT: 70 IN | RESPIRATION RATE: 18 BRPM | WEIGHT: 174 LBS | HEART RATE: 74 BPM | TEMPERATURE: 97.9 F | SYSTOLIC BLOOD PRESSURE: 118 MMHG

## 2024-04-29 DIAGNOSIS — R42 DIZZINESS AND GIDDINESS: Primary | ICD-10-CM

## 2024-04-29 DIAGNOSIS — J30.2 SEASONAL ALLERGIES: ICD-10-CM

## 2024-04-29 PROCEDURE — 99213 OFFICE O/P EST LOW 20 MIN: CPT

## 2024-04-29 RX ORDER — MECLIZINE HYDROCHLORIDE 25 MG/1
25 TABLET ORAL EVERY 8 HOURS PRN
Qty: 30 TABLET | Refills: 0 | Status: SHIPPED | OUTPATIENT
Start: 2024-04-29

## 2024-04-29 NOTE — PATIENT INSTRUCTIONS
Take meclizine as needed as prescribed.    Continue over the counter allergy medication.   Start Flonase.    Drink plenty of fluids to stay hydrated.    Follow-up with your PCP in 3-5 days.    Go to the ED for any persistent or worsening symptoms.

## 2024-04-29 NOTE — PROGRESS NOTES
[facility]        NAME: Eliezer Miguel is a 58 y.o. male  : 1965    MRN: [id number]  DATE: 2024  TIME: 6:28 PM    Assessment and Plan   Dizziness and giddiness [R42]  1. Dizziness and giddiness  meclizine (ANTIVERT) 25 mg tablet      2. Seasonal allergies              Patient Instructions     Take meclizine as needed as prescribed.    Continue over the counter allergy medication.   Start Flonase.    Drink plenty of fluids to stay hydrated.    Follow-up with your PCP in 3-5 days.    Go to the ED for any persistent or worsening symptoms.    If tests are performed, our office will contact you with results only if changes need to made to the care plan discussed with you at the visit. [facility].      Chief Complaint     Chief Complaint   Patient presents with    Dizziness     Last night patient woke up last nigh and it feels like the room was spinning. Symptoms were happening when he was lying down. When he sat up it was better. No symptoms today         History of Present Illness       58-year-old male who presents for evaluation of an isolated episode of dizziness that occurred during the night last night. Patient states he woke up and felt like the room was spinning. Sensation disappeared as soon as he sat up in bed and he has not had any further episodes since. At time of occurrence patient denies having any headaches, blurred vision, floaters/halos, eye pain, loss of vision, weakness, and nausea/vomiting. Denies history of migraines. Of note, patient has had significant seasonal allergy symptoms of nasal congestion, frontal headaches, and bilateral ear discomfort. Takes daily allergy medication OTC from [facility].        Review of Systems   Review of Systems   Constitutional:  Negative for chills and fever.   Eyes:  Negative for photophobia, pain and visual disturbance.   Respiratory:  Negative for chest tightness and shortness of breath.     Cardiovascular:  Negative for chest pain and palpitations.   Gastrointestinal:  Negative for abdominal pain, diarrhea, nausea and vomiting.   Musculoskeletal:  Negative for arthralgias and myalgias.   Skin:  Negative for pallor and rash.   Allergic/Immunologic: Positive for environmental allergies.   Neurological:  Positive for dizziness. Negative for weakness, numbness and headaches.         Current Medications       Current Outpatient Medications:     albuterol (Proventil HFA) 90 mcg/act inhaler, Inhale 2 puffs every 6 (six) hours as needed for wheezing, Disp: , Rfl:     meclizine (ANTIVERT) 25 mg tablet, Take 1 tablet (25 mg total) by mouth every 8 (eight) hours as needed for dizziness, Disp: 30 tablet, Rfl: 0    naproxen (Naprosyn) 500 mg tablet, Take 1 tablet (500 mg total) by mouth 2 (two) times a day with meals, Disp: 28 tablet, Rfl: 0    naproxen (Naprosyn) 500 mg tablet, Take 1 tablet (500 mg total) by mouth 2 (two) times a day with meals, Disp: 30 tablet, Rfl: 0    olmesartan (BENICAR) 20 mg tablet, Take 20 mg by mouth daily, Disp: , Rfl:     sodium picosulfate, magnesium oxide, citric acid (Clenpiq) oral solution, Take 175 mL (1 bottle) the evening before the colonoscopy, between 5 PM and 9 PM, followed by a second 175 mL bottle 5 hours before the colonoscopy. (Patient not taking: Reported on 4/29/2024), Disp: 350 mL, Rfl: 0    Current Allergies     Allergies as of 04/29/2024    (No Known Allergies)            The following portions of the patient's history were reviewed and updated as appropriate: allergies, current medications, past family history, past medical history, past social history, past surgical history and problem list.     Past Medical History:   Diagnosis Date    Allergies        Past Surgical History:   Procedure Laterality Date    TONSILLECTOMY         Family History   Problem Relation Age of Onset    Hypertension Mother     Cancer Mother     Heart disease Father          Medications  "have been verified.        Objective   /68   Pulse 74   Temp 97.9 °F (36.6 °C)   Resp 18   Ht 5' 10\" (1.778 m)   Wt 78.9 kg (174 lb)   SpO2 97%   BMI 24.97 kg/m²        Physical Exam     Physical Exam  Vitals and nursing note reviewed.   Constitutional:       General: He is not in acute distress.     Appearance: He is not ill-appearing or diaphoretic.   HENT:      Head: Normocephalic and atraumatic.      Right Ear: Tympanic membrane, ear canal and external ear normal.      Left Ear: Tympanic membrane, ear canal and external ear normal.      Nose: Nose normal.      Mouth/Throat:      Mouth: Mucous membranes are moist.      Pharynx: Oropharynx is clear.   Eyes:      Extraocular Movements: Extraocular movements intact.      Conjunctiva/sclera: Conjunctivae normal.      Pupils: Pupils are equal, round, and reactive to light.   Cardiovascular:      Rate and Rhythm: Normal rate and regular rhythm.      Pulses: Normal pulses.      Heart sounds: Normal heart sounds.   Pulmonary:      Effort: Pulmonary effort is normal.      Breath sounds: Normal breath sounds.   Musculoskeletal:         General: Normal range of motion.      Cervical back: Normal range of motion and neck supple.   Skin:     General: Skin is warm and dry.      Capillary Refill: Capillary refill takes less than 2 seconds.   Neurological:      Mental Status: He is alert and oriented to person, place, and time.      Sensory: Sensation is intact.      Motor: Motor function is intact. No weakness.      Coordination: Coordination is intact.      Gait: Gait normal.                   "

## 2024-05-09 RX ORDER — SODIUM CHLORIDE 9 MG/ML
100 INJECTION, SOLUTION INTRAVENOUS CONTINUOUS
Status: CANCELLED | OUTPATIENT
Start: 2024-05-09

## 2024-05-10 ENCOUNTER — ANESTHESIA (OUTPATIENT)
Dept: GASTROENTEROLOGY | Facility: HOSPITAL | Age: 59
End: 2024-05-10

## 2024-05-10 ENCOUNTER — HOSPITAL ENCOUNTER (OUTPATIENT)
Dept: GASTROENTEROLOGY | Facility: HOSPITAL | Age: 59
Setting detail: OUTPATIENT SURGERY
End: 2024-05-10
Attending: INTERNAL MEDICINE
Payer: COMMERCIAL

## 2024-05-10 ENCOUNTER — ANESTHESIA EVENT (OUTPATIENT)
Dept: GASTROENTEROLOGY | Facility: HOSPITAL | Age: 59
End: 2024-05-10

## 2024-05-10 VITALS
BODY MASS INDEX: 24.91 KG/M2 | HEART RATE: 60 BPM | TEMPERATURE: 98.4 F | HEIGHT: 70 IN | SYSTOLIC BLOOD PRESSURE: 147 MMHG | WEIGHT: 174 LBS | DIASTOLIC BLOOD PRESSURE: 76 MMHG | RESPIRATION RATE: 16 BRPM | OXYGEN SATURATION: 96 %

## 2024-05-10 DIAGNOSIS — Z12.11 SCREENING FOR COLON CANCER: ICD-10-CM

## 2024-05-10 PROBLEM — I10 HTN (HYPERTENSION): Status: ACTIVE | Noted: 2024-05-10

## 2024-05-10 PROCEDURE — 45380 COLONOSCOPY AND BIOPSY: CPT | Performed by: STUDENT IN AN ORGANIZED HEALTH CARE EDUCATION/TRAINING PROGRAM

## 2024-05-10 PROCEDURE — 88305 TISSUE EXAM BY PATHOLOGIST: CPT | Performed by: PATHOLOGY

## 2024-05-10 RX ORDER — ACETAMINOPHEN 500 MG
500 TABLET ORAL EVERY 6 HOURS PRN
COMMUNITY

## 2024-05-10 RX ORDER — PROPOFOL 10 MG/ML
INJECTION, EMULSION INTRAVENOUS AS NEEDED
Status: DISCONTINUED | OUTPATIENT
Start: 2024-05-10 | End: 2024-05-10

## 2024-05-10 RX ORDER — PROPOFOL 10 MG/ML
INJECTION, EMULSION INTRAVENOUS CONTINUOUS PRN
Status: DISCONTINUED | OUTPATIENT
Start: 2024-05-10 | End: 2024-05-10

## 2024-05-10 RX ORDER — SODIUM CHLORIDE 9 MG/ML
100 INJECTION, SOLUTION INTRAVENOUS CONTINUOUS
Status: DISCONTINUED | OUTPATIENT
Start: 2024-05-10 | End: 2024-05-14 | Stop reason: HOSPADM

## 2024-05-10 RX ADMIN — SODIUM CHLORIDE: 0.9 INJECTION, SOLUTION INTRAVENOUS at 07:19

## 2024-05-10 RX ADMIN — PROPOFOL 100 MCG/KG/MIN: 10 INJECTION, EMULSION INTRAVENOUS at 07:27

## 2024-05-10 NOTE — H&P
"History and Physical -  [facility]  Eliezer Miguel 58 y.o. male MRN: [id number]    HPI: Eilezer Miguel is a 58 y.o. male who presents for colonoscopy for history of polyps.    REVIEW OF SYSTEMS: Per the HPI, and otherwise unremarkable.    Historical Information   Past Medical History:   Diagnosis Date    Allergies      Past Surgical History:   Procedure Laterality Date    TONSILLECTOMY       Social History   Social History     Substance and Sexual Activity   Alcohol Use Never     Social History     Substance and Sexual Activity   Drug Use Not on file     Social History     Tobacco Use   Smoking Status Former   Smokeless Tobacco Never     Family History   Problem Relation Age of Onset    Hypertension Mother     Cancer Mother     Heart disease Father        Meds/Allergies       Current Outpatient Medications:     acetaminophen (TYLENOL) 500 mg tablet    albuterol (Proventil HFA) 90 mcg/act inhaler    olmesartan (BENICAR) 20 mg tablet    sodium picosulfate, magnesium oxide, citric acid (Clenpiq) oral solution    meclizine (ANTIVERT) 25 mg tablet    naproxen (Naprosyn) 500 mg tablet    naproxen (Naprosyn) 500 mg tablet    Current Facility-Administered Medications:     sodium chloride 0.9 % infusion, 100 mL/hr, Intravenous, Continuous    No Known Allergies    Objective     /89   Pulse 77   Temp 98.4 °F (36.9 °C) (Temporal)   Resp 20   Ht 5' 10\" (1.778 m)   Wt 78.9 kg (174 lb)   SpO2 96%   BMI 24.97 kg/m²     PHYSICAL EXAM    General Appearance: NAD, cooperative, alert  Eyes: Anicteric  GI:  Soft, non-tender, non-distended; normal bowel sounds; no masses, no organomegaly   Rectal: Deferred until procedure  Musculoskeletal: No edema.  Skin:  No jaundice    ASSESSMENT/PLAN:  This is a 58 y.o. year old male here for colonoscopy, and he is stable and optimized for his procedure.        "

## 2024-05-10 NOTE — ANESTHESIA PREPROCEDURE EVALUATION
Procedure:  COLONOSCOPY    Relevant Problems   ANESTHESIA (within normal limits)      CARDIO   (+) HTN (hypertension)      MUSCULOSKELETAL   (+) Idiopathic chronic gout of foot without tophus      PULMONARY (within normal limits)  No known SULMA; pt is considering sleep study   (-) Smoking   (-) URI (upper respiratory infection)      Physical Exam    Airway    Mallampati score: II  TM Distance: >3 FB  Neck ROM: full     Dental   No notable dental hx     Cardiovascular      Pulmonary      Other Findings      Anesthesia Plan  ASA Score- 2     Anesthesia Type- IV sedation with anesthesia with ASA Monitors.         Additional Monitors:     Airway Plan:            Plan Factors-Exercise tolerance (METS): >4 METS.    Chart reviewed.   Existing labs reviewed. Patient summary reviewed.    Patient is not a current smoker.              Induction- intravenous.    Postoperative Plan-     Informed Consent- Anesthetic plan and risks discussed with patient.  I personally reviewed this patient with the CRNA. Discussed and agreed on the Anesthesia Plan with the CRNA..

## 2024-05-10 NOTE — ANESTHESIA POSTPROCEDURE EVALUATION
Post-Op Assessment Note    CV Status:  Stable  Pain Score: 0    Pain management: adequate       Mental Status:  Alert and awake   Hydration Status:  Euvolemic   PONV Controlled:  None   Airway Patency:  Patent     Post Op Vitals Reviewed: Yes    No anethesia notable event occurred.    Staff: CRNA               BP   111/72   Temp      Pulse  75   Resp   16   SpO2   99%

## 2024-05-16 PROCEDURE — 88305 TISSUE EXAM BY PATHOLOGIST: CPT | Performed by: PATHOLOGY

## 2024-10-14 ENCOUNTER — OFFICE VISIT (OUTPATIENT)
Dept: FAMILY MEDICINE CLINIC | Facility: HOSPITAL | Age: 59
End: 2024-10-14
Payer: COMMERCIAL

## 2024-10-14 VITALS
BODY MASS INDEX: 25.08 KG/M2 | SYSTOLIC BLOOD PRESSURE: 136 MMHG | WEIGHT: 175.2 LBS | HEART RATE: 75 BPM | DIASTOLIC BLOOD PRESSURE: 82 MMHG | HEIGHT: 70 IN | OXYGEN SATURATION: 96 %

## 2024-10-14 DIAGNOSIS — M25.562 ACUTE PAIN OF LEFT KNEE: Primary | ICD-10-CM

## 2024-10-14 PROCEDURE — 99213 OFFICE O/P EST LOW 20 MIN: CPT

## 2024-10-14 NOTE — PROGRESS NOTES
"Ambulatory Visit  Name: Eliezer Miguel      : 1965      MRN: [id number]  Encounter Provider: Zhen Reyna MD  Encounter Date: 10/14/2024   Encounter department: [facility] PRIMARY CARE [address]    Assessment & Plan  Acute pain of left knee  Normal exam, no deformity or joint instability, no numbness or tingling or weakness, given chronicity of symptoms will order x-ray and refer to PT and Ortho  Orders:    XR knee 3 vw left non injury; Future    Ambulatory Referral to Orthopedic Surgery; Future    Ambulatory Referral to Physical Therapy; Future       History of Present Illness     HPI  Left knee pain, 3/4 weeks ago, no injury, stable, no treatment.  Discomfort.      Review of Systems   Constitutional:  Negative for chills and fever.   Musculoskeletal:  Positive for arthralgias (left knee). Negative for back pain, gait problem, joint swelling and myalgias.           Objective     /82   Pulse 75   Ht 5' 10\" (1.778 m)   Wt 79.5 kg (175 lb 3.2 oz)   SpO2 96%   BMI 25.14 kg/m²     Physical Exam  Constitutional:       General: He is not in acute distress.     Appearance: Normal appearance.   Musculoskeletal:         General: No swelling, tenderness, deformity or signs of injury.      Right lower leg: No edema.      Left lower leg: No edema.      Comments: Normal exam, no deformity or joint instability, no numbness or tingling or weakness, no back pain or tenderness, negative straight leg test bilaterally   Skin:     Findings: No erythema.   Neurological:      Mental Status: He is alert and oriented to person, place, and time.   Psychiatric:         Mood and Affect: Mood normal.         Behavior: Behavior normal.         "

## 2024-12-11 ENCOUNTER — EVALUATION (OUTPATIENT)
Dept: PHYSICAL THERAPY | Facility: CLINIC | Age: 59
End: 2024-12-11
Payer: COMMERCIAL

## 2024-12-11 DIAGNOSIS — M79.605 LOWER LIMB PAIN, POSTERIOR, LEFT: Primary | ICD-10-CM

## 2024-12-11 PROCEDURE — 97161 PT EVAL LOW COMPLEX 20 MIN: CPT | Performed by: PHYSICAL THERAPIST

## 2024-12-11 PROCEDURE — 97140 MANUAL THERAPY 1/> REGIONS: CPT | Performed by: PHYSICAL THERAPIST

## 2024-12-11 PROCEDURE — 97110 THERAPEUTIC EXERCISES: CPT | Performed by: PHYSICAL THERAPIST

## 2024-12-11 NOTE — PROGRESS NOTES
"PT Evaluation     Today's date: 2024  Patient name: Eliezer Miguel  : 1965  MRN: [id number]  Referring provider: Zhen Richter MD  Dx:   Encounter Diagnosis     ICD-10-CM    1. Lower limb pain, posterior, left  M79.605           Start Time: 1040  Stop Time: 1135  Total time in clinic (min): 55 minutes    Assessment/Plan  Eliezer Miguel is a 58 y.o. male who was referred to physical therapy for management of lower leg pain.  Primary impairments include report of pain with resisted HS testing and decreased soft tissue mobility.  Consequently, patient has difficulty completing ADLs including bed mobility.  Eliezer would benefit from skilled intervention to address all deficits and improve functional capability.  Patient is a good candidate for therapy, pending compliance with HEP and consistent participation in physical therapy.  Thank you for the referral and please do not hesitate to contact me with any questions or concerns regarding Eliezer’s care!      Plan  Frequency:1-2x per week   Duration in weeks: 8   POC start date: 24  POC end date: 25  Therapeutic exercise/activity, neuromuscular reeducation, manual therapy, and modalities.   Patient understands and agrees to plan of care.    Goals  Short Term--4 weeks  1. Patient will report 2 point decrease in pain levels.  2. Patient will demonstrate 5/5 prone hamstring MMT with no onset of pain.   3. Patient will be independent/adherent to HEP.    Long Term--By Discharge  1. Patient will achieve expected FOTO score.  2. Patient will report no onset of pain with bed mobility.    Patient's Goal: Get rid of pain. Avoid it becoming chronic issue.       Subjective  History   Date of Onset: Approx 3 months ago. Description: Insidious onset of L proximal/lateral lower leg pain that he describes as an \"achy pull.\" Pain is only provoked when he is lying supine with legs extended and he pulls his L leg up, leading with his knee in the coronal " "plane, ending in a \"figure 4\" position with L foot resting next to his R thigh. He notices it at night when getting situated in bed. Once in this position, pain resolves. Patient reports that sx do not occur with any other ADLs. He denies numbness/paresthesia or loss of strength/ROM in his lower extremity.       Pain at best: 0  Pain at worst: 5    Imaging  None.       Objective    GAIT: unremarkable  Squat assess: unremarkable      Knee A/PROM:   Right =  Full     Left = Full           MMT          PROM    Hip         R          L          R         L   Flex. 5 5 Full Full   Extn.       Abd.       Add.       IR.   WNL WNL   ER.   WNL WNL                 MMT    Knee      R          L   Extn. 5 5   Prone HS  5 P!   Prone med HS  4+   Prone lat HS  4+ P!                MMT    Ankle         R          L   PF 15 HR 15 HR   DF. 5 5       Palpation: (-) TTP but myofascial trigger point identified just distal/lateral to knee joint, posteriorly.          Knee: post drawer = NEG   anterior draw test=  NEG  Valgus stress test= NEG  varus stress test = NEG   Olga test = NEG                    CO-MORBIDITIES: HTN. Gout.   HEP ACCESS CODE: [id number]  FOTO Completed On: 12/11/24    POC Expires Unit Limit Auth Expiration Date PT/OT/STVisit Limit   2/5/25 bomn N/a 90 PCY                     Auth Status DATE 12/11        NA--90v PCY Visit # 1         Remaining 89        MANUAL THERAPY         IASTM HS belly/insertion JAB        Prone ecc HS c man resistance                                              THERAPEUTIC EXERCISE HEP         Active HS stretch  4x20\"         Gastroc stretch 4x20\"         Self STM HS insertion 2-3 min                   Ecc HR                                                                                                              NEUROMUSCULAR REEDUCATION                                                                                                                                                     "   THERAPEUTIC ACTIVITY                                                                                                    MODALITIES

## 2024-12-26 ENCOUNTER — OFFICE VISIT (OUTPATIENT)
Dept: PHYSICAL THERAPY | Facility: CLINIC | Age: 59
End: 2024-12-26
Payer: COMMERCIAL

## 2024-12-26 DIAGNOSIS — M79.605 LOWER LIMB PAIN, POSTERIOR, LEFT: Primary | ICD-10-CM

## 2024-12-26 PROCEDURE — 97140 MANUAL THERAPY 1/> REGIONS: CPT | Performed by: PHYSICAL THERAPIST

## 2024-12-26 PROCEDURE — 97110 THERAPEUTIC EXERCISES: CPT | Performed by: PHYSICAL THERAPIST

## 2024-12-26 NOTE — PROGRESS NOTES
"Daily Note     Today's date: 2024  Patient name: Eliezer Miguel  : 1965  MRN: [id number]  Referring provider: Zhen Richter MD  Dx:   Encounter Diagnosis     ICD-10-CM    1. Lower limb pain, posterior, left  M79.605                      Subjective: Reports still getting pain with bending his L knee while laying in bed.       Objective: See treatment diary below      Assessment: + LLD and pain with R sided ASLR as well as tibial nerve tensioner.  Improved with HVLAT to R sided anterior Gr 5.  Re-assess improved SLR performance and longer pain with L HS strength testing suggesting mobility defcitis causing hip activation deficits at L HS due to pelvic obliquity adjusting length tension relationship for L HS.  Pt felt comfortable with ambulation post performance and felt he did not need to continue with PT.        Plan: Continue per plan of care.      CO-MORBIDITIES: HTN. Gout.   HEP ACCESS CODE: [id number]  FOTO Completed On: 24    POC Expires Unit Limit Auth Expiration Date PT/OT/STVisit Limit   25 bomn N/a 90 PCY                     Auth Status DATE        NA--90v PCY Visit # 1 2        Remaining 89 88       MANUAL THERAPY         IASTM HS belly/insertion JAB        Prone ecc HS c man resistance          SIJ assess and Gr 5 anterior mob to R side  10 min        Re-assess to HS and selective tissue tensioning   15 min                          THERAPEUTIC EXERCISE HEP         Active HS stretch  4x20\"  4x20\"       Gastroc stretch 4x20\"  4x20\"       Self STM HS insertion 2-3 min                   Ecc HR          Std extension    10x                                                                                                 NEUROMUSCULAR REEDUCATION           PNE for pain control and model of SIJ explanation.     10 min                                                                                                                                          THERAPEUTIC ACTIVITY  "                                                                                                   MODALITIES

## 2025-01-13 ENCOUNTER — OFFICE VISIT (OUTPATIENT)
Dept: URGENT CARE | Facility: CLINIC | Age: 60
End: 2025-01-13
Payer: COMMERCIAL

## 2025-01-13 ENCOUNTER — DOCUMENTATION (OUTPATIENT)
Dept: ADMINISTRATIVE | Facility: OTHER | Age: 60
End: 2025-01-13

## 2025-01-13 VITALS
SYSTOLIC BLOOD PRESSURE: 148 MMHG | HEART RATE: 76 BPM | TEMPERATURE: 97.8 F | RESPIRATION RATE: 16 BRPM | OXYGEN SATURATION: 96 % | DIASTOLIC BLOOD PRESSURE: 84 MMHG

## 2025-01-13 DIAGNOSIS — J06.9 VIRAL URI WITH COUGH: Primary | ICD-10-CM

## 2025-01-13 PROCEDURE — 99213 OFFICE O/P EST LOW 20 MIN: CPT

## 2025-01-13 RX ORDER — BENZONATATE 200 MG/1
200 CAPSULE ORAL 3 TIMES DAILY PRN
Qty: 20 CAPSULE | Refills: 0 | Status: SHIPPED | OUTPATIENT
Start: 2025-01-13

## 2025-01-13 RX ORDER — ALBUTEROL SULFATE 90 UG/1
2 INHALANT RESPIRATORY (INHALATION) EVERY 6 HOURS PRN
Qty: 8.5 G | Refills: 0 | Status: SHIPPED | OUTPATIENT
Start: 2025-01-13

## 2025-01-13 RX ORDER — METHYLPREDNISOLONE 4 MG/1
TABLET ORAL
Qty: 21 TABLET | Refills: 0 | Status: SHIPPED | OUTPATIENT
Start: 2025-01-13

## 2025-01-13 NOTE — PROGRESS NOTES
[facility]        NAME: Eliezer Miguel is a 59 y.o. male  : 1965    MRN: [id number]  DATE: 2025  TIME: 10:18 AM    Assessment and Plan   Viral URI with cough [J06.9]  1. Viral URI with cough  methylPREDNISolone 4 MG tablet therapy pack    benzonatate (TESSALON) 200 MG capsule    albuterol (Proventil HFA) 90 mcg/act inhaler            Patient Instructions     Your symptoms are consistent with a viral illness.    For nasal/sinus congestion you can try steam, warm compresses, saline nasal spray, Neti pot, nasal steroid (Flonase, Nasocort), or nasal decongestant (Afrin - for 3 days only).    You can try a decongestant (Sudafed) if > 6 years of age and no history of high blood pressure.    For cough you can take an over-the-counter expectorant such as plain Robitussion or Mucinex. A spoonful of honey at bedtime may also be helpful.    For cold symptoms with high blood pressure take Coricidin cough/cold.     For sore throat you can use Cepacol lozenges, do warm salt water gargles, drink warm water with lemon or herbal teas, or use an over-the-counter throat spray (Chloraseptic).    You can take ibuprofen/Motrin and acetaminophen/Tylenol as needed for pain, fever, body aches. Do not take ibuprofen/Motrin/Advil if you have a history of heart disease, bleeding ulcers, or if you take blood thinners.     Drink plenty of fluids to stay hydrated. Airborne or Emergen-C for extra vitamin C and zinc.    Follow up with your PCP in 3-5 days for persistent symptoms.    Go to the ER if symptoms worsen.     If tests are performed, our office will contact you with results only if changes need to made to the care plan discussed with you at the visit. You can review your full results on [facility].      Chief Complaint     Chief Complaint   Patient presents with    Cough     Patient with fatigue, cough, x5 days. Patient had a fever x3 days but states that has since resolved. Patient states he has  been using his albuterol inhaler with minimal relief.          History of Present Illness       59-year-old male presenting with cold-like symptoms x 1 week.  Patient reports congestion and cough  Throat is sore from coughing  Feels fatigued  Had fevers Tmax 102.5 and chills at onset of illness which have since resolved  Reports having childhood asthma, exercise and illness-induced  Uses Albuterol inhaler as needed for cough and shortness of breath  Throughout illness using every 6 hours  Non-smoker  Taking Mucinex, DayQuil, and Motrin        Review of Systems   Review of Systems   Constitutional:  Positive for chills, fatigue and fever (resolved).   HENT:  Positive for congestion and sore throat. Negative for ear pain and sinus pressure.    Eyes:  Negative for discharge and redness.   Respiratory:  Positive for cough, shortness of breath and wheezing.    Cardiovascular:  Negative for chest pain and palpitations.   Gastrointestinal:  Negative for abdominal pain, diarrhea and vomiting.   Skin:  Negative for rash.   Neurological:  Positive for headaches. Negative for dizziness and light-headedness.         Current Medications       Current Outpatient Medications:     albuterol (Proventil HFA) 90 mcg/act inhaler, Inhale 2 puffs every 6 (six) hours as needed for wheezing, Disp: 8.5 g, Rfl: 0    benzonatate (TESSALON) 200 MG capsule, Take 1 capsule (200 mg total) by mouth 3 (three) times a day as needed for cough, Disp: 20 capsule, Rfl: 0    methylPREDNISolone 4 MG tablet therapy pack, Use as directed on package, Disp: 21 tablet, Rfl: 0    meclizine (ANTIVERT) 25 mg tablet, Take 1 tablet (25 mg total) by mouth every 8 (eight) hours as needed for dizziness (Patient not taking: Reported on 10/14/2024), Disp: 30 tablet, Rfl: 0    olmesartan (BENICAR) 20 mg tablet, Take 20 mg by mouth daily, Disp: , Rfl:     Current Allergies     Allergies as of 01/13/2025    (No Known Allergies)            The following portions of the  patient's history were reviewed and updated as appropriate: allergies, current medications, past family history, past medical history, past social history, past surgical history and problem list.     Past Medical History:   Diagnosis Date    Allergies     Asthma     Environmental allergies     Hypertension        Past Surgical History:   Procedure Laterality Date    TONSILLECTOMY         Family History   Problem Relation Age of Onset    Hypertension Mother     Cancer Mother     Heart disease Father          Medications have been verified.        Objective   /84   Pulse 76   Temp 97.8 °F (36.6 °C)   Resp 16   SpO2 96%        Physical Exam     Physical Exam  Vitals and nursing note reviewed.   Constitutional:       General: He is not in acute distress.     Appearance: He is not ill-appearing or diaphoretic.   HENT:      Head: Normocephalic and atraumatic.      Comments: No tenderness to frontal or maxillary sinus.     Right Ear: Tympanic membrane, ear canal and external ear normal.      Left Ear: Tympanic membrane, ear canal and external ear normal.      Nose: Congestion present.      Mouth/Throat:      Mouth: Mucous membranes are moist.      Comments: PND  Eyes:      Conjunctiva/sclera: Conjunctivae normal.   Cardiovascular:      Rate and Rhythm: Normal rate and regular rhythm.   Pulmonary:      Effort: Pulmonary effort is normal.      Breath sounds: Normal breath sounds. No wheezing, rhonchi or rales.   Musculoskeletal:         General: Normal range of motion.      Cervical back: Normal range of motion and neck supple.   Skin:     General: Skin is warm and dry.      Capillary Refill: Capillary refill takes less than 2 seconds.   Neurological:      Mental Status: He is alert and oriented to person, place, and time.

## 2025-01-13 NOTE — PROGRESS NOTES
Urgent Care BP  Received: Today  Ree Corral RN  P Patient Reported Team         Blood pressure elevated  Appointment department: [facility]  Appointment provider: * No providers found *  Blood pressure  01/13/25 0955 148/84  01/13/25 0953 154/94  Active  Date User Comment   01/13/25 0955 Ree Corral RN [[id number]] None

## 2025-01-14 VITALS — DIASTOLIC BLOOD PRESSURE: 84 MMHG | SYSTOLIC BLOOD PRESSURE: 148 MMHG

## 2025-01-14 NOTE — PROGRESS NOTES
01/14/25 10:56 AM    Patient was called after the Urgent Care visit ; a message was left for the patient to return the call    Thank you.  Karma Zavala MA  [facility]

## 2025-03-06 ENCOUNTER — OFFICE VISIT (OUTPATIENT)
Dept: FAMILY MEDICINE CLINIC | Facility: HOSPITAL | Age: 60
End: 2025-03-06
Payer: COMMERCIAL

## 2025-03-06 VITALS
HEIGHT: 70 IN | SYSTOLIC BLOOD PRESSURE: 148 MMHG | DIASTOLIC BLOOD PRESSURE: 80 MMHG | WEIGHT: 172.2 LBS | BODY MASS INDEX: 24.65 KG/M2 | HEART RATE: 73 BPM | OXYGEN SATURATION: 98 %

## 2025-03-06 DIAGNOSIS — I10 PRIMARY HYPERTENSION: Primary | ICD-10-CM

## 2025-03-06 DIAGNOSIS — Z11.4 SCREENING FOR HIV (HUMAN IMMUNODEFICIENCY VIRUS): ICD-10-CM

## 2025-03-06 DIAGNOSIS — Z11.59 NEED FOR HEPATITIS C SCREENING TEST: ICD-10-CM

## 2025-03-06 DIAGNOSIS — L91.8 SKIN TAG OF PERINEUM IN MALE: ICD-10-CM

## 2025-03-06 PROCEDURE — 99214 OFFICE O/P EST MOD 30 MIN: CPT

## 2025-03-06 RX ORDER — AMOXICILLIN 500 MG/1
500 CAPSULE ORAL
COMMUNITY
Start: 2025-02-28

## 2025-03-06 NOTE — PROGRESS NOTES
"Name: Eliezer Miguel      : 1965      MRN: [id number]  Encounter Provider: Zhen Richter MD  Encounter Date: 3/6/2025   Encounter department: [facility] PRIMARY CARE [address]  :  Assessment & Plan  Primary hypertension  BP elevated again today at 148/80, adherent to olmesartan 20 mg daily, offered to increase dosage, patient opting to focus on lifestyle modifications and monitor blood pressure at home and return in 2 weeks in consideration of adjusting blood pressure medication.    Orders:    Comprehensive metabolic panel; Future    Lipid Panel with Direct LDL reflex; Future    Hemoglobin A1C; Future    Need for hepatitis C screening test    Orders:    Hepatitis C Antibody; Future    Screening for HIV (human immunodeficiency virus)    Orders:    HIV 1/2 Antigen/Antibody (Fourth Generation) with Reflex Testing ([facility], [facility], or EXTERNAL LAB); Future    Skin tag of perineum in male  Skin tag successfully removed with scissors without complication       Skin tag removal    Date/Time: 3/6/2025 11:40 AM    Performed by: Zhen Richter MD  Authorized by: Zhen Richter MD  Universal Protocol:  Consent: Verbal consent obtained.  Consent given by: patient  Patient identity confirmed: verbally with patient               History of Present Illness   HPI  Review of Systems   Skin:         Skin tab on upper inner left thigh   Neurological:  Negative for dizziness and headaches.       Objective   /80   Pulse 73   Ht 5' 10\" (1.778 m)   Wt 78.1 kg (172 lb 3.2 oz)   SpO2 98%   BMI 24.71 kg/m²      Physical Exam  Constitutional:       General: He is not in acute distress.  HENT:      Head: Normocephalic.   Pulmonary:      Effort: No respiratory distress.   Skin:     Comments: Skin tag on upper inner left thigh, successfully removed   Neurological:      Mental Status: He is alert and oriented to person, place, and time.   Psychiatric:         Mood and Affect: Mood normal.         " Behavior: Behavior normal.

## 2025-03-06 NOTE — ASSESSMENT & PLAN NOTE
BP elevated again today at 148/80, adherent to olmesartan 20 mg daily, offered to increase dosage, patient opting to focus on lifestyle modifications and monitor blood pressure at home and return in 2 weeks in consideration of adjusting blood pressure medication.    Orders:    Comprehensive metabolic panel; Future    Lipid Panel with Direct LDL reflex; Future    Hemoglobin A1C; Future

## 2025-03-24 ENCOUNTER — APPOINTMENT (OUTPATIENT)
Dept: RADIOLOGY | Facility: CLINIC | Age: 60
End: 2025-03-24
Payer: COMMERCIAL

## 2025-03-24 ENCOUNTER — OFFICE VISIT (OUTPATIENT)
Dept: OBGYN CLINIC | Facility: CLINIC | Age: 60
End: 2025-03-24
Payer: COMMERCIAL

## 2025-03-24 DIAGNOSIS — M76.892 POPLITEUS TENDINITIS OF LEFT LOWER EXTREMITY: Primary | ICD-10-CM

## 2025-03-24 DIAGNOSIS — M25.571 RIGHT ANKLE PAIN, UNSPECIFIED CHRONICITY: ICD-10-CM

## 2025-03-24 DIAGNOSIS — M76.892 HAMSTRING TENDINITIS OF LEFT THIGH: ICD-10-CM

## 2025-03-24 PROCEDURE — 99213 OFFICE O/P EST LOW 20 MIN: CPT | Performed by: FAMILY MEDICINE

## 2025-03-24 PROCEDURE — 73610 X-RAY EXAM OF ANKLE: CPT

## 2025-03-24 NOTE — PROGRESS NOTES
1. Popliteus tendinitis of left lower extremity  Ambulatory referral to Physical Therapy      2. Right ankle pain, unspecified chronicity  XR ankle 3+ vw right      3. Hamstring tendinitis of left thigh  Ambulatory referral to Physical Therapy        Orders Placed This Encounter   Procedures    XR ankle 3+ vw right    Ambulatory referral to Physical Therapy        ASSESSMENT/PLAN:  Right Lateral Ankle Sprain and small avulsion sharmin fracture  Left posterolateral knee pain   Popliteus tendinitis   Hamstring tendinitis    Repeat X-ray next visit: None    Return if symptoms worsen or fail to improve.    Patient instructions below verbally summarized in person during encounter:  Patient Instructions   Ankle sprains are tears of ligaments in your ankle. Ligaments are fibrous tissues that hold bones together like stitches.  Some ligaments such as the ACL in the knee do not heal on their own; however, the ligaments involved in ankle sprain usually do heal without issue over 4-6 weeks.  Some people even have relief for more minor sprains within 1-2 weeks.  Initial treatment includes PRICE treatment including Protection with ankle splint, Rest with range of motion exercises to prevent stiffness, Ice for 20 minutes every 2-3 hours for the first 2 days or until swelling plateaus, and Elevation to keep the foot and ankle 6-10 inches above your heart when lying down to allow for drainage of fluid from your ankle. Prolonged rest within a few days including immobilization of your ankle in braces, crutches, or Cam boot can result in severe stiffness and worsening of symptoms. As such, please start daily range-of-motion exercises moving your ankle in circles and drawing the alphabet using year big toe as if it were a pencil in the air (LISBETH Rick 2001).    Physical therapy is also key to helping speed up the healing process as well as for preventing future sprain.  Once sprained you are at risk for sprain again for up to 1 year after  injury.  Physical therapy including home exercises for 8-12 weeks has been shown to be preventative of future sprains (B&K 4th).     You may attempt return to running when walking is no longer painful. I recommended gradual return to running to include a few days of 50% walking/50% jogging 1 mile, followed by 50% jogging/50% running 1 mile, followed by 100% running 1 mile if pain free. I recommend increasing mileage at a slow pace thereafter (LISBETH Rick 2001).    I also recommend wearing lace-up ankle brace when playing sports for up to 1 year to prevent the rate of recurrence but not severity of recurrent ankle sprains. (Arch Orthop Trauma Surg. 2013 Aug; 133 (8): 3644-1375).     If you have persistent symptoms at 6 weeks then we will consider an MRI of your ankle to evaluate for other injuries that can be hidden such as an osteochondral fracture of the talus bone (B&K4th).    For patient's left knee I explained that he appears to have popliteus tendinitis and hamstring tendinitis. If no improvement we will consider MRI.             __________________________________________________________________________    IMAGING STUDIES: (I personally reviewed images in PACS, and if available-report):  Xray right ankle 3/24/25:  Small sharmin distal fibula fracture       PAST REPORTS:    __________________________________________________________________________    HISTORY OF PRESENT ILLNESS:  Evaluation of right ankle injury which occurred approximately 8 days ago when patient missed a step on a ladder.  He describes inversion mechanism injury.  Points the last with his ankle source of pain.  Also had an abnormal feeling on the lateral asp of the ankle for which he was concerned and sought opinion.  Today, he is ambulating with sneakers.  No pain at rest.  Mild pain when ambulating.    Left knee lateral pain atraumatic mild to moderate intermittent worse with stairs. No improvement with physical therapy.         Review of  Systems      Following history reviewed and update:    Past Medical History:   Diagnosis Date    Allergies     Asthma     Environmental allergies     Hypertension      Past Surgical History:   Procedure Laterality Date    TONSILLECTOMY       Social History   Social History     Substance and Sexual Activity   Alcohol Use Not Currently    Comment: rare     Social History     Substance and Sexual Activity   Drug Use Never     Social History     Tobacco Use   Smoking Status Former   Smokeless Tobacco Never     Family History   Problem Relation Age of Onset    Hypertension Mother     Cancer Mother     Heart disease Father      No Known Allergies       Physical Exam  There were no vitals taken for this visit.        Ortho Exam  RIGHT ANKLE EXAM  Observation  GAIT:  normal    Inspection  Erythema: no  Ecchymosis: no  Edema:  none    Tenderness  Proximal Fibula: no  (Maisonneuve frx)  AiTFL: no  (2cm proximal-medial to tip lateral malleolus 92% sens, 29% spec)  ATFL: no  CFL: no  PTFL: no  Achilles:  no  Deltoid: No  Peroneal: no  Tibialis Anterior: no  Tibialis Posterior: no    Bony Tenderpoints:  Lateral Malleolus: +  Base of 5th MT: no  Medial Malleolus: no  Navicular: no  Talar dome: No    ROM  Dorsiflexion: intact  Plantarflexion: intact    Muscle Strength  Pronation: intact without pain  Supination: intact without pain    Calcaneal Squeeze: negative    LEFT KNEE:  Erythema: no  Swelling: no  Increased Warmth: no  Tenderness: none  Flexion: intact  Extension: intact  Patellar Displacement:  Patellar Tilt:  Patellar Apprehension: negative  Patellar Grind Linares's: negative  Lachman's: negative  Drawer: negative  Varus laxity: negative  Valgus laxity: negative  Altagracia: negative   +pain reproduced with resisted isometric contraction knee flexion    __________________________________________________________________________  Procedures

## 2025-03-24 NOTE — PATIENT INSTRUCTIONS
Ankle sprains are tears of ligaments in your ankle. Ligaments are fibrous tissues that hold bones together like stitches.  Some ligaments such as the ACL in the knee do not heal on their own; however, the ligaments involved in ankle sprain usually do heal without issue over 4-6 weeks.  Some people even have relief for more minor sprains within 1-2 weeks.  Initial treatment includes PRICE treatment including Protection with ankle splint, Rest with range of motion exercises to prevent stiffness, Ice for 20 minutes every 2-3 hours for the first 2 days or until swelling plateaus, and Elevation to keep the foot and ankle 6-10 inches above your heart when lying down to allow for drainage of fluid from your ankle. Prolonged rest within a few days including immobilization of your ankle in braces, crutches, or Cam boot can result in severe stiffness and worsening of symptoms. As such, please start daily range-of-motion exercises moving your ankle in circles and drawing the alphabet using year big toe as if it were a pencil in the air (AFP Rick 2001).    Physical therapy is also key to helping speed up the healing process as well as for preventing future sprain.  Once sprained you are at risk for sprain again for up to 1 year after injury.  Physical therapy including home exercises for 8-12 weeks has been shown to be preventative of future sprains (B&K 4th).     You may attempt return to running when walking is no longer painful. I recommended gradual return to running to include a few days of 50% walking/50% jogging 1 mile, followed by 50% jogging/50% running 1 mile, followed by 100% running 1 mile if pain free. I recommend increasing mileage at a slow pace thereafter (AFP Rick 2001).    I also recommend wearing lace-up ankle brace when playing sports for up to 1 year to prevent the rate of recurrence but not severity of recurrent ankle sprains. (Arch Orthop Trauma Surg. 2013 Aug; 133 (8): 0029-7303).     If you have  persistent symptoms at 6 weeks then we will consider an MRI of your ankle to evaluate for other injuries that can be hidden such as an osteochondral fracture of the talus bone (B&K4th).    For patient's left knee I explained that he appears to have popliteus tendinitis and hamstring tendinitis. If no improvement we will consider MRI.

## 2025-04-01 ENCOUNTER — EVALUATION (OUTPATIENT)
Dept: PHYSICAL THERAPY | Facility: CLINIC | Age: 60
End: 2025-04-01
Payer: COMMERCIAL

## 2025-04-01 DIAGNOSIS — M76.892 HAMSTRING TENDINITIS OF LEFT THIGH: ICD-10-CM

## 2025-04-01 DIAGNOSIS — M76.892 POPLITEUS TENDINITIS OF LEFT LOWER EXTREMITY: Primary | ICD-10-CM

## 2025-04-01 PROCEDURE — 97110 THERAPEUTIC EXERCISES: CPT | Performed by: PHYSICAL THERAPIST

## 2025-04-01 PROCEDURE — 97162 PT EVAL MOD COMPLEX 30 MIN: CPT | Performed by: PHYSICAL THERAPIST

## 2025-04-01 NOTE — PROGRESS NOTES
PT Evaluation     Today's date: 2025  Patient name: Eliezer Miguel  : 1965  MRN: [id number]  Referring provider: Olaf Mendoza  Dx:   Encounter Diagnosis     ICD-10-CM    1. Popliteus tendinitis of left lower extremity  M76.892 Ambulatory referral to Physical Therapy      2. Hamstring tendinitis of left thigh  M76.892 Ambulatory referral to Physical Therapy                     Assessment  Impairments: activity intolerance, lacks appropriate home exercise program and pain with function  Barriers to therapy: Pt is a 59 y.o. year old male coming to outpatient PT with a diagnosis of L hamstring strain on set 2024. Pt presents with increased pain and TTP, good L knee ROM, good HS strength, and overall decreased functional mobility. Pt would benefit from skilled PT services in order to address these deficits and reach maximum level of function. Thank you kindly for the referral!    Therapist instructed pt in dynamic hamstring stretching to be performed on a regular basis between LE machines and elliptical when exercising at the [facility].  Understanding of Dx/Px/POC: good     Prognosis: good    Goals  STG's ( 3-4 weeks)  1. Pt will be independent in HEP  2. Pt will have no pain when bending his L knee in bed when laying supine  LTG's ( 6- 8 weeks)  1. Improve FOTO score by 6-8 points  2. Pt will have decreased pain to 0-1/10 at worst  3. Pt will have no pain when performing HS curl machine at the gym    Plan  Patient would benefit from: PT eval and skilled physical therapy  Planned modality interventions: low level laser therapy    Planned therapy interventions: manual therapy, neuromuscular re-education, strengthening, stretching, therapeutic activities, flexibility and home exercise program    Frequency: 1x week  Duration in weeks: 8  Plan of Care beginning date: 2025  Plan of Care expiration date: 2025  Treatment plan discussed with: PTA and patient        Subjective  Evaluation    History of Present Illness  Mechanism of injury: Pt reports increased L LE pain 2024 2* unknown etiology. Pt would get pain when he would bend his knee when laying supine in bed. Pt feels discomfort when going up and down the steps and has pain when performing a HS curl machine at the [facility]. Pt has less pain at rest and more pain when he moves his knee.  Pt has some discomfort when walking up an incline. L HS pain can be intermittent throughout the day, with some activities causing pain, and then the same activity not causing pain later on in the day.    Work: retired  Hobbies; golf, exercise  Gait: no abnormalities  Patient Goals  Patient goals for therapy: decreased pain    Pain  At best pain ratin  At worst pain ratin  Location: L hamstring  Quality: dull ache and sharp    Social Support  Steps to enter house: yes (2)  Stairs in house: yes (1 flight to 2nd floor, 1 flight to basement)     Employment status: not working    Diagnostic Tests  No diagnostic tests performed  Treatments  Previous treatment: physical therapy        Objective     Neurological Testing     Sensation     Hip   Left Hip   Intact: light touch    Right Hip   Intact: light touch    Active Range of Motion   Left Knee   Flexion: 145 degrees   Extension: 0 degrees     Right Knee   Flexion: WFL  Extension: 0 degrees     Additional Active Range of Motion Details  HS flexibility: R: 65*  L: 70* with opposite knee extended  (+) TTP L medial HS/ popliteus musculature    Functional testing:   SL HR; 20 reps no pain  Deep squat; symmetrical no pain  SLS; 30 seconds- no pain, good control  SL sit to stand; no pain, good balance      Passive Range of Motion   Left Knee   Flexion: 153 degrees   Extension: 0 degrees     Right Knee   Flexion: 147 degrees   Extension: 0 degrees     Strength/Myotome Testing     Left Hip   Planes of Motion   Flexion: 4+  Extension: 4+  Abduction: 5  Adduction: 4+  External rotation: 5  Internal  rotation: 5    Right Hip   Normal muscle strength    Left Knee   Normal strength  Left knee flexion strength: mild pain.    Right Knee   Normal strength            Daily Treatment Diary     Precautions: none  CO-MORBIDITIES:  HEP ACCESS CODE:   FOTO Completed On:     POC Expires Reeval for Medicare to be completed  Unit Limit Auth Expiration Date PT/OT/STVisit Limit   5/27/25 By visit n/a 4 N/a bomn    Completed on visit                    Auth Status DATE 4/1        Approved Visit # 1         Remaining         MANUAL THERAPY         Cold laser L medial hamstring 5'        GISTM/ MFR L popliteus/ medial HS musculature                                             THERAPEUTIC EXERCISE HEP 5' HS stretch instruct         Elliptical- for LE ROM          Dynamic HS stretch on step: medial and lateral          HS dynamic stretch: monster walk          HS dynamic stretch ground sweeps          RDL w/ cone          Hamburners on stool          Star: L LE SLS                    BFR: HS curl prone          SL bridge          Hip hinge at wall w/ KB          Quadriped fire hydrant          Bridge w/ HS curl on pball                                                  NEUROMUSCULAR REEDUCATION                                                                                                                                                       THERAPEUTIC ACTIVITY                                                  GAIT TRAINING                                                  MODALITIES

## 2025-04-08 ENCOUNTER — EVALUATION (OUTPATIENT)
Dept: PHYSICAL THERAPY | Facility: CLINIC | Age: 60
End: 2025-04-08
Payer: COMMERCIAL

## 2025-04-08 DIAGNOSIS — M76.892 HAMSTRING TENDINITIS OF LEFT THIGH: ICD-10-CM

## 2025-04-08 DIAGNOSIS — M76.892 POPLITEUS TENDINITIS OF LEFT LOWER EXTREMITY: Primary | ICD-10-CM

## 2025-04-08 PROCEDURE — 97140 MANUAL THERAPY 1/> REGIONS: CPT | Performed by: PHYSICAL THERAPIST

## 2025-04-08 PROCEDURE — 97110 THERAPEUTIC EXERCISES: CPT | Performed by: PHYSICAL THERAPIST

## 2025-04-08 NOTE — PROGRESS NOTES
"Daily Note     Today's date: 2025  Patient name: Eliezer Miguel  : 1965  MRN: [id number]  Referring provider: Olaf Mendoza  Dx:   Encounter Diagnosis     ICD-10-CM    1. Popliteus tendinitis of left lower extremity  M76.892       2. Hamstring tendinitis of left thigh  M76.892                      Subjective: Pt reports he has been going to the [facility] regularly to exercise. No new changes since IE. Pt continues to feel discomfort in L HS region.      Objective: See treatment diary below      Assessment: Tolerated treatment well. Patient exhibited good technique with therapeutic exercises. Pt had mild tenderness with manual therapy. Pt was challenged by HS sweeps and RDL      Plan: Continue per plan of care.      Daily Treatment Diary     Precautions: none  CO-MORBIDITIES:  HEP ACCESS CODE:   FOTO Completed On:     POC Expires Reeval for Medicare to be completed  Unit Limit Auth Expiration Date PT/OT/STVisit Limit   25 By visit n/a 4 N/a bomn    Completed on visit                    Auth Status DATE        Approved Visit # 1 2        Remaining         MANUAL THERAPY         Cold laser L medial hamstring 5' 5'       GISTM/ MFR L popliteus/ medial HS musculature  10'                                           THERAPEUTIC EXERCISE HEP 5' HS stretch instruct         Elliptical- for LE ROM   5'       Dynamic HS stretch on step: medial and lateral   3x20\"       HS dynamic stretch: monster walk   24' x2       HS dynamic stretch ground sweeps   24'x2       RDL w/ cone   7.5# 2x10       Hamburners on stool   20\"x2       Star: L LE SLS   X5 ea       HS stretch w/ strap   1' pst man       BFR: HS curl prone          SL bridge   10x5\"       Hip hinge at wall w/ KB   10# x10       Quadriped fire hydrant          Bridge w/ HS curl on pball   10                                               NEUROMUSCULAR REEDUCATION                                                                                     "                                                                   THERAPEUTIC ACTIVITY                                                  GAIT TRAINING                                                  MODALITIES

## 2025-04-15 ENCOUNTER — OFFICE VISIT (OUTPATIENT)
Dept: PHYSICAL THERAPY | Facility: CLINIC | Age: 60
End: 2025-04-15
Payer: COMMERCIAL

## 2025-04-15 DIAGNOSIS — M76.892 POPLITEUS TENDINITIS OF LEFT LOWER EXTREMITY: Primary | ICD-10-CM

## 2025-04-15 DIAGNOSIS — M76.892 HAMSTRING TENDINITIS OF LEFT THIGH: ICD-10-CM

## 2025-04-15 PROCEDURE — 97110 THERAPEUTIC EXERCISES: CPT | Performed by: PHYSICAL THERAPIST

## 2025-04-15 PROCEDURE — 97140 MANUAL THERAPY 1/> REGIONS: CPT | Performed by: PHYSICAL THERAPIST

## 2025-04-15 NOTE — PROGRESS NOTES
"Daily Note     Today's date: 4/15/2025  Patient name: Eliezer Miguel  : 1965  MRN: [id number]  Referring provider: Olaf Mendoza  Dx:   Encounter Diagnosis     ICD-10-CM    1. Popliteus tendinitis of left lower extremity  M76.892       2. Hamstring tendinitis of left thigh  M76.892                      Subjective: Pt reports no increase in soreness after last visit. Pt could feel some discomfort when performing the HS curl machine, but it was not severe. Pt is compliant in HEP.      Objective: See treatment diary below      Assessment: Tolerated treatment well. Patient exhibited good technique with therapeutic exercises. Pt could feel some discomfort with hamburners. Pt needed to have some UE assist for balance with RDL. Pt did not have pain with L knee flexion and hip ER in supine after PT manual therapy and laser.      Plan: Continue per plan of care. Focus on decreasing pain.     Daily Treatment Diary     Precautions: none  CO-MORBIDITIES:  HEP ACCESS CODE:   FOTO Completed On:     POC Expires Reeval for Medicare to be completed  Unit Limit Auth Expiration Date PT/OT/STVisit Limit   25 By visit n/a 4 N/a bomn    Completed on visit                    Auth Status DATE 4/1 4/8 4/15      Approved Visit # 1 2 3       Remaining         MANUAL THERAPY         Cold laser L medial hamstring 5' 5' 5'      GISTM/ MFR L popliteus/ medial HS musculature  10' 10'                                          THERAPEUTIC EXERCISE HEP 5' HS stretch instruct         Elliptical- for LE ROM   5' 5'      Dynamic HS stretch on step: medial and lateral   3x20\" x10      HS dynamic stretch: monster walk   24' x2 24'x2      HS dynamic stretch ground sweeps   24'x2 hold      RDL w/ cone   7.5# 2x10 7.5# 2x10      Hamburners on stool   20\"x2 24'x2      Star: L LE SLS   X5 ea       HS stretch w/ strap   1' pst man 3x20\"      BFR: HS curl prone          SL bridge   10x5\" 10x5\"      Hip hinge at wall w/ KB   10# x10 10# " x10      Quadriped fire hydrant    10      Bridge w/ HS curl on pball   10 10                                              NEUROMUSCULAR REEDUCATION                                                                                                                                                       THERAPEUTIC ACTIVITY                                                  GAIT TRAINING                                                  MODALITIES

## 2025-04-22 ENCOUNTER — OFFICE VISIT (OUTPATIENT)
Dept: PHYSICAL THERAPY | Facility: CLINIC | Age: 60
End: 2025-04-22
Attending: FAMILY MEDICINE
Payer: COMMERCIAL

## 2025-04-22 DIAGNOSIS — M76.892 HAMSTRING TENDINITIS OF LEFT THIGH: ICD-10-CM

## 2025-04-22 DIAGNOSIS — M76.892 POPLITEUS TENDINITIS OF LEFT LOWER EXTREMITY: Primary | ICD-10-CM

## 2025-04-22 PROCEDURE — 97112 NEUROMUSCULAR REEDUCATION: CPT

## 2025-04-22 PROCEDURE — 97530 THERAPEUTIC ACTIVITIES: CPT

## 2025-04-22 PROCEDURE — 97110 THERAPEUTIC EXERCISES: CPT

## 2025-04-22 NOTE — PROGRESS NOTES
"Daily Note     Today's date: 2025  Patient name: Eliezer Miguel  : 1965  MRN: [id number]  Referring provider: Olaf Mendoza  Dx:   Encounter Diagnosis     ICD-10-CM    1. Popliteus tendinitis of left lower extremity  M76.892       2. Hamstring tendinitis of left thigh  M76.892                      Subjective: Pt reports feeling good however, still has some discomfort.     Objective: See treatment diary below      Assessment: Tolerated treatment well. Patient exhibited good technique with therapeutic exercises. Encourage pt to avoid hyperextending his knees.       Plan: Continue per plan of care. Focus on decreasing pain.     Daily Treatment Diary     Precautions: none  CO-MORBIDITIES:  HEP ACCESS CODE:   FOTO Completed On:     POC Expires Reeval for Medicare to be completed  Unit Limit Auth Expiration Date PT/OT/STVisit Limit   25 By visit n/a 4 N/a bomn    Completed on visit                    Auth Status DATE 4/1 4/8 4/15 4/22     Approved Visit # 1 2 3 4      Remaining         MANUAL THERAPY         Cold laser L medial hamstring 5' 5' 5' 8' +set up     GISTM/ MFR L popliteus/ medial HS musculature  10' 10'                                          THERAPEUTIC EXERCISE HEP 5' HS stretch instruct         Elliptical- for LE ROM   5' 5' 6'     Dynamic HS stretch on step: medial and lateral   3x20\" x10 X10  (Sweeps)     HS dynamic stretch: monster walk   24' x2 24'x2 Frankenstein walk      HS dynamic stretch ground sweeps   24'x2 hold      RDL w/ cone   7.5# 2x10 7.5# 2x10 7.5# 2x10     Hamburners on stool   20\"x2 24'x2 24'x2     Star: L LE SLS   X5 ea       HS stretch w/ strap   1' pst man 3x20\" 3x20\"      BFR: HS curl prone          SL bridge   10x5\" 10x5\" 10x5\" L/R     Hip hinge at wall w/ KB   10# x10 10# x10 10# x10     Quadriped fire hydrant    10 10     Bridge w/ HS curl on pball   10 10 10                                             NEUROMUSCULAR REEDUCATION                        "                                                                                                                                THERAPEUTIC ACTIVITY          edu                                        GAIT TRAINING                                                  MODALITIES

## 2025-04-24 ENCOUNTER — OFFICE VISIT (OUTPATIENT)
Dept: URGENT CARE | Facility: CLINIC | Age: 60
End: 2025-04-24
Payer: COMMERCIAL

## 2025-04-24 VITALS
OXYGEN SATURATION: 97 % | HEART RATE: 76 BPM | SYSTOLIC BLOOD PRESSURE: 140 MMHG | TEMPERATURE: 97.4 F | RESPIRATION RATE: 18 BRPM | DIASTOLIC BLOOD PRESSURE: 76 MMHG

## 2025-04-24 DIAGNOSIS — M10.00 ACUTE IDIOPATHIC GOUT, UNSPECIFIED SITE: Primary | ICD-10-CM

## 2025-04-24 PROCEDURE — 99213 OFFICE O/P EST LOW 20 MIN: CPT

## 2025-04-24 RX ORDER — PREDNISONE 10 MG/1
50 TABLET ORAL DAILY
Qty: 25 TABLET | Refills: 0 | Status: SHIPPED | OUTPATIENT
Start: 2025-04-24 | End: 2025-04-29

## 2025-04-24 NOTE — PROGRESS NOTES
[facility]        NAME: Eliezer Miguel is a 59 y.o. male  : 1965    MRN: [id number]  DATE: 2025  TIME: 12:12 PM    Assessment and Plan   Acute idiopathic gout, unspecified site [M10.00]  1. Acute idiopathic gout, unspecified site  predniSONE 10 mg tablet            Patient Instructions       Follow up with PCP in 3-5 days.  Proceed to  ER if symptoms worsen.    If tests have been performed at [facility], our office will contact you with results if changes need to be made to the care plan discussed with you at the visit.  You [facility] full [facility]    Chief Complaint     Chief Complaint   Patient presents with    Gout Pain     Patient states the past few days he's had going pain and swelling in his left foot/ great toe. Pain denies any redness as of right now. Patient states he has pain when walking.           History of Present Illness       60 y/o M presents concerns over left great toe swelling, erythema, pain x 1 week.  Patient admits this is the start of his gout exacerbation.  Denies any injuries or trauma.  Patient presents today to take medicine to prevent worsening        Review of Systems   Review of Systems   Constitutional:  Negative for chills and fever.   Musculoskeletal:  Positive for joint swelling.   Skin:  Positive for color change.         Current Medications       Current Outpatient Medications:     predniSONE 10 mg tablet, Take 5 tablets (50 mg total) by mouth daily for 5 days, Disp: 25 tablet, Rfl: 0    albuterol (Proventil HFA) 90 mcg/act inhaler, Inhale 2 puffs every 6 (six) hours as needed for wheezing, Disp: 8.5 g, Rfl: 0    amoxicillin (AMOXIL) 500 mg capsule, Take 500 mg by mouth, Disp: , Rfl:     olmesartan (BENICAR) 20 mg tablet, Take 20 mg by mouth daily, Disp: , Rfl:     Current Allergies     Allergies as of 2025    (No Known Allergies)            The following portions of the patient's history were reviewed and updated as  appropriate: allergies, current medications, past family history, past medical history, past social history, past surgical history and problem list.     Past Medical History:   Diagnosis Date    Allergies     Asthma     Environmental allergies     Hypertension        Past Surgical History:   Procedure Laterality Date    ROOT CANAL      TONSILLECTOMY         Family History   Problem Relation Age of Onset    Hypertension Mother     Cancer Mother     Heart disease Father          Medications have been verified.        Objective   /76   Pulse 76   Temp (!) 97.4 °F (36.3 °C)   Resp 18   SpO2 97%   No LMP for male patient.       Physical Exam     Physical Exam  Vitals and nursing note reviewed.   Constitutional:       General: He is not in acute distress.     Appearance: He is not toxic-appearing.   HENT:      Head: Normocephalic and atraumatic.   Eyes:      Extraocular Movements: Extraocular movements intact.      Conjunctiva/sclera: Conjunctivae normal.   Pulmonary:      Effort: Pulmonary effort is normal.   Musculoskeletal:         General: Swelling and tenderness present.   Skin:     Findings: Erythema present.   Neurological:      Mental Status: He is alert.   Psychiatric:         Mood and Affect: Mood normal.         Behavior: Behavior normal.

## 2025-05-01 ENCOUNTER — EVALUATION (OUTPATIENT)
Dept: PHYSICAL THERAPY | Facility: CLINIC | Age: 60
End: 2025-05-01
Payer: COMMERCIAL

## 2025-05-01 DIAGNOSIS — M76.892 POPLITEUS TENDINITIS OF LEFT LOWER EXTREMITY: Primary | ICD-10-CM

## 2025-05-01 DIAGNOSIS — M76.892 HAMSTRING TENDINITIS OF LEFT THIGH: ICD-10-CM

## 2025-05-01 PROCEDURE — 97140 MANUAL THERAPY 1/> REGIONS: CPT | Performed by: PHYSICAL THERAPIST

## 2025-05-01 PROCEDURE — 97110 THERAPEUTIC EXERCISES: CPT | Performed by: PHYSICAL THERAPIST

## 2025-05-01 NOTE — PROGRESS NOTES
"Daily Note     Today's date: 2025  Patient name: Eliezer Miguel  : 1965  MRN: [id number]  Referring provider: Olaf Mendoza  Dx:   Encounter Diagnosis     ICD-10-CM    1. Popliteus tendinitis of left lower extremity  M76.892       2. Hamstring tendinitis of left thigh  M76.892                      Subjective: Pt reports his L hamstring region was feeling progressively worse. Pt went to Urgent Care for a gout flare up on 25, was placed on Prednisone and is now feeling very good with his L hamstring region. Pt stopped taking Prednisone on Monday.    Objective: See treatment diary below      Assessment: Tolerated treatment well. Patient exhibited good technique with therapeutic exercises. Pt had no pain with manual therapy or there ex. Pt is compliant in HEP and is stretching on a regular basis. Held GISTM today.      Plan: Continue per plan of care. Plan on RA in 2 weeks.     Daily Treatment Diary     Precautions: none  CO-MORBIDITIES:  HEP ACCESS CODE:   FOTO Completed On:     POC Expires Reeval for Medicare to be completed  Unit Limit Auth Expiration Date PT/OT/STVisit Limit   25 By visit n/a 4 N/a bomn    Completed on visit                    Auth Status DATE 4/1 4/8 4/15 4/22 5/1    Approved Visit # 1 2 3 4 5     Remaining         MANUAL THERAPY         Cold laser L medial hamstring 5' 5' 5' 8' +set up 5'    GISTM/ MFR L popliteus/ medial HS musculature  10' 10'  10'                                        THERAPEUTIC EXERCISE HEP 5' HS stretch instruct         Elliptical- for LE ROM   5' 5' 6' 6'    Dynamic HS stretch on step: medial and lateral   3x20\" x10 X10  (Sweeps) X10 sweeps    HS dynamic stretch: monster walk   24' x2 24'x2 Frankenstein walk  24' x2    HS dynamic stretch ground sweeps   24'x2 hold      RDL w/ cone   7.5# 2x10 7.5# 2x10 7.5# 2x10 7.5# 2x10    Hamburners on stool   20\"x2 24'x2 24'x2 24' x2    Star: L LE SLS   X5 ea   X5 gr tf    HS stretch w/ strap   1' pst " "man 3x20\" 3x20\"  3x20\" pre and post man    BFR: HS curl prone          SL bridge   10x5\" 10x5\" 10x5\" L/R 10x5\" R/ L    Hip hinge at wall w/ KB   10# x10 10# x10 10# x10     Quadriped fire hydrant    10 10     Bridge w/ HS curl on pball   10 10 10 10                                            NEUROMUSCULAR REEDUCATION                                                                                           THERAPEUTIC ACTIVITY          edu                                        GAIT TRAINING                                                  MODALITIES                                            "

## 2025-05-09 ENCOUNTER — OFFICE VISIT (OUTPATIENT)
Dept: PHYSICAL THERAPY | Facility: CLINIC | Age: 60
End: 2025-05-09
Attending: FAMILY MEDICINE
Payer: COMMERCIAL

## 2025-05-09 DIAGNOSIS — M76.892 HAMSTRING TENDINITIS OF LEFT THIGH: ICD-10-CM

## 2025-05-09 DIAGNOSIS — M76.892 POPLITEUS TENDINITIS OF LEFT LOWER EXTREMITY: Primary | ICD-10-CM

## 2025-05-09 PROCEDURE — 97140 MANUAL THERAPY 1/> REGIONS: CPT

## 2025-05-09 PROCEDURE — 97110 THERAPEUTIC EXERCISES: CPT

## 2025-05-09 PROCEDURE — 97112 NEUROMUSCULAR REEDUCATION: CPT

## 2025-05-09 NOTE — PROGRESS NOTES
"Daily Note     Today's date: 2025  Patient name: Eliezer Miguel  : 1965  MRN: [id number]  Referring provider: Olaf Mendoza*  Dx:   Encounter Diagnosis     ICD-10-CM    1. Popliteus tendinitis of left lower extremity  M76.892       2. Hamstring tendinitis of left thigh  M76.892                      Subjective: Pt reports \"since starting PT if he is honest it has progressively gotten worse\", \"the most relief I was provided was once I was taken prednisone and the hs burners makes it worse and maybe the laser\"    Objective: See treatment diary below      Assessment: modified below flowsheet TE, to focus more on the quad strength, hs flexibility, and decrease inflammation with manual modalities. While pt was on CP discussed HEP and was to help decrease HS inflammation.       Plan: Continue per plan of care. Plan on RA in 2 weeks.     Daily Treatment Diary     Precautions: none  CO-MORBIDITIES:  HEP ACCESS CODE:   FOTO Completed On:     POC Expires Reeval for Medicare to be completed  Unit Limit Auth Expiration Date PT/OT/STVisit Limit   25 By visit n/a 4 N/a bomn    Completed on visit                    Auth Status DATE 4/15 4/22 5/1 5/9   Approved Visit # 3 4 5 6    Remaining       MANUAL THERAPY       Cold laser L medial hamstring 5' 8' +set up 5' def   GISTM/ MFR L popliteus/ medial HS musculature 10'  10' 8' iastm +cupping                               THERAPEUTIC EXERCISE HEP       Elliptical- for LE ROM  5' 6' 6' 6'   Dynamic HS stretch on step: medial and lateral  x10 X10  (Sweeps) X10 sweeps X10 sweeps   HS dynamic stretch: monster walk  24'x2 Frankenstein walk  24' x2 24' x2   HS dynamic stretch ground sweeps  hold      RDL w/ cone  7.5# 2x10 7.5# 2x10 7.5# 2x10    Hamburners on stool  24'x2 24'x2 24' x2 Held per pt   Star: L LE SLS    X5 gr tf    HS stretch w/ strap  3x20\" 3x20\"  3x20\" pre and post man    BFR: HS curl prone        SL bridge  10x5\" 10x5\" L/R 10x5\" R/ L    Hip hinge " at wall w/ KB  10# x10 10# x10     Quadriped fire hydrant  10 10     Bridge w/ HS curl on pball  10 10 10            Quad machine L     25#  20    SAQ L     20x   SLR L     20x   SL add L     20x   SL abd L     20x           NEUROMUSCULAR REEDUCATION                                                                         THERAPEUTIC ACTIVITY        edu                                GAIT TRAINING                                        MODALITIES       cp    Hep review

## 2025-05-16 ENCOUNTER — EVALUATION (OUTPATIENT)
Dept: PHYSICAL THERAPY | Facility: CLINIC | Age: 60
End: 2025-05-16
Attending: FAMILY MEDICINE
Payer: COMMERCIAL

## 2025-05-16 DIAGNOSIS — M76.892 POPLITEUS TENDINITIS OF LEFT LOWER EXTREMITY: Primary | ICD-10-CM

## 2025-05-16 DIAGNOSIS — M76.892 HAMSTRING TENDINITIS OF LEFT THIGH: ICD-10-CM

## 2025-05-16 PROCEDURE — 97110 THERAPEUTIC EXERCISES: CPT | Performed by: PHYSICAL THERAPIST

## 2025-05-16 PROCEDURE — 97140 MANUAL THERAPY 1/> REGIONS: CPT | Performed by: PHYSICAL THERAPIST

## 2025-05-16 NOTE — PROGRESS NOTES
PT Discharge    Today's date: 2025  Patient name: Eliezer Miguel  : 1965  MRN: [id number]  Referring provider: Olaf Mendoza  Dx:   Encounter Diagnosis     ICD-10-CM    1. Popliteus tendinitis of left lower extremity  M76.892       2. Hamstring tendinitis of left thigh  M76.892                      Assessment    Assessment details: Since starting skilled PT, pain levels are decreased, L knee and hip ROM are WFL's with good functional progress. Recommend pt be discharged to an independent HEP at this time. Pt will continue stretching and there ex program at the [facility]. Pt may purchase a metal massage tool to duplicate manual therapy performed in skilled PT.  Understanding of Dx/Px/POC: good     Prognosis: good    Goals  STG's ( 3-4 weeks)  1. Pt will be independent in HEP- met  2. Pt will have no pain when bending his L knee in bed when laying supine- met  LTG's ( 6- 8 weeks)  1. Improve FOTO score by 6-8 points- not met  2. Pt will have decreased pain to 0-1/10 at worst- not met  3. Pt will have no pain when performing HS curl machine at the gym- met    Plan    Frequency: Discharge to HEP; follow up prn.  Duration in weeks: 8  Plan of Care beginning date: 2025  Plan of Care expiration date: 2025  Treatment plan discussed with: PTA and patient        Subjective Evaluation    History of Present Illness  Mechanism of injury: I.E; Pt reports increased L LE pain 2024 2* unknown etiology. Pt would get pain when he would bend his knee when laying supine in bed. Pt feels discomfort when going up and down the steps and has pain when performing a HS curl machine at the [facility]. Pt has less pain at rest and more pain when he moves his knee.  Pt has some discomfort when walking up an incline. L HS pain can be intermittent throughout the day, with some activities causing pain, and then the same activity not causing pain later on in the day.    25: Pt is compliant in HEP. Pt gets the  most stretching in his specific area with a standing HS stretch with his hands to the outside of his knee. Pt reports no functional limitations with home activities. Pt was performing a heavy activity at home with a lot of up and down with bending and could feel his L hamstring region, but it did not stop him from doing the task. Pt is able to walk up and down hill or inclines with golf activities and is able to walk on level surfaces without aggravation of sx. Pt no longer has pain when bending his knee in a supine position.    Work: retired  Hobbies; golf, exercise  Gait: no abnormalities  Patient Goals  Patient goals for therapy: decreased pain    Pain  At best pain ratin  At worst pain ratin  Location: L hamstring  Quality: dull ache and sharp    Social Support  Steps to enter house: yes (2)  Stairs in house: yes (1 flight to 2nd floor, 1 flight to basement)     Employment status: not working    Diagnostic Tests  No diagnostic tests performed  Treatments  Previous treatment: physical therapy        Objective     Neurological Testing     Sensation     Hip   Left Hip   Intact: light touch    Right Hip   Intact: light touch    Active Range of Motion   Left Knee   Flexion: 145 degrees   Extension: 0 degrees     Right Knee   Flexion: WFL  Extension: 0 degrees     Additional Active Range of Motion Details  HS flexibility: R: 65*  L: 70* with opposite knee extended  (+) TTP L medial HS/ popliteus musculature    Functional testing:   SL HR; 20 reps no pain  Deep squat; symmetrical no pain  SLS; 30 seconds- no pain, good control  SL sit to stand; no pain, good balance      Passive Range of Motion   Left Knee   Flexion: 153 degrees   Extension: 0 degrees     Right Knee   Flexion: 147 degrees   Extension: 0 degrees     Strength/Myotome Testing     Left Hip   Planes of Motion   Flexion: 4+  Extension: 5  Abduction: 5  Adduction: 4+  External rotation: 5  Internal rotation: 5    Right Hip   Normal muscle  "strength    Left Knee   Normal strength    Right Knee   Normal strength      Daily Treatment Diary     Precautions: none  CO-MORBIDITIES:  HEP ACCESS CODE:   FOTO Completed On:     POC Expires Reeval for Medicare to be completed  Unit Limit Auth Expiration Date PT/OT/STVisit Limit   5/27/25 By visit n/a 4 N/a bomn    Completed on visit                    Auth Status DATE 4/15 4/22 5/1 5/9 5/16   Approved Visit # 3 4 5 6 7    Remaining        MANUAL THERAPY        Cold laser L medial hamstring 5' 8' +set up 5' def hold   GISTM/ MFR L popliteus/ medial HS musculature 10'  10' 8' iastm +cupping 15'   Progress note     10'                           THERAPEUTIC EXERCISE HEP     Reviewed;    Elliptical- for LE ROM  5' 6' 6' 6' 6'   Dynamic HS stretch on step: medial and lateral  x10 X10  (Sweeps) X10 sweeps X10 sweeps x10   HS dynamic stretch: monster walk  24'x2 Frankenstein walk  24' x2 24' x2    HS dynamic stretch ground sweeps  hold       RDL w/ cone  7.5# 2x10 7.5# 2x10 7.5# 2x10     [place] on stool  24'x2 24'x2 24' x2 Held per pt hold   Star: L LE SLS    X5 gr tf     HS stretch w/ strap  3x20\" 3x20\"  3x20\" pre and post man  3x20\"   BFR: HS curl prone         SL bridge  10x5\" 10x5\" L/R 10x5\" R/ L     Hip hinge at wall w/ KB  10# x10 10# x10      Quadriped fire hydrant  10 10      Bridge w/ HS curl on pball  10 10 10  hold            Quad machine L     25#  20     SAQ L     20x    SLR L     20x    SL add L     20x    SL abd L     20x             NEUROMUSCULAR REEDUCATION                                                                                  THERAPEUTIC ACTIVITY         edu                                    GAIT TRAINING                                             MODALITIES        cp    Hep review                              "